# Patient Record
Sex: MALE | Race: WHITE | NOT HISPANIC OR LATINO | Employment: FULL TIME | URBAN - METROPOLITAN AREA
[De-identification: names, ages, dates, MRNs, and addresses within clinical notes are randomized per-mention and may not be internally consistent; named-entity substitution may affect disease eponyms.]

---

## 2024-04-23 ENCOUNTER — OFFICE VISIT (OUTPATIENT)
Dept: FAMILY MEDICINE CLINIC | Facility: CLINIC | Age: 49
End: 2024-04-23
Payer: COMMERCIAL

## 2024-04-23 VITALS
HEART RATE: 84 BPM | WEIGHT: 231 LBS | BODY MASS INDEX: 34.21 KG/M2 | DIASTOLIC BLOOD PRESSURE: 78 MMHG | SYSTOLIC BLOOD PRESSURE: 126 MMHG | OXYGEN SATURATION: 97 % | HEIGHT: 69 IN | TEMPERATURE: 98.2 F

## 2024-04-23 DIAGNOSIS — I10 PRIMARY HYPERTENSION: ICD-10-CM

## 2024-04-23 DIAGNOSIS — E78.5 HYPERLIPIDEMIA, UNSPECIFIED HYPERLIPIDEMIA TYPE: ICD-10-CM

## 2024-04-23 DIAGNOSIS — M25.562 CHRONIC PAIN OF BOTH KNEES: ICD-10-CM

## 2024-04-23 DIAGNOSIS — R73.03 PREDIABETES: ICD-10-CM

## 2024-04-23 DIAGNOSIS — Z76.89 ESTABLISHING CARE WITH NEW DOCTOR, ENCOUNTER FOR: Primary | ICD-10-CM

## 2024-04-23 DIAGNOSIS — E29.1 HYPOGONADISM IN MALE: ICD-10-CM

## 2024-04-23 DIAGNOSIS — M25.561 CHRONIC PAIN OF BOTH KNEES: ICD-10-CM

## 2024-04-23 DIAGNOSIS — M45.9 ANKYLOSING SPONDYLITIS, UNSPECIFIED SITE OF SPINE: ICD-10-CM

## 2024-04-23 DIAGNOSIS — R53.83 OTHER FATIGUE: ICD-10-CM

## 2024-04-23 DIAGNOSIS — G89.29 CHRONIC PAIN OF BOTH KNEES: ICD-10-CM

## 2024-04-23 DIAGNOSIS — E66.9 CLASS 1 OBESITY WITH SERIOUS COMORBIDITY AND BODY MASS INDEX (BMI) OF 34.0 TO 34.9 IN ADULT, UNSPECIFIED OBESITY TYPE: ICD-10-CM

## 2024-04-23 PROBLEM — E66.811 CLASS 1 OBESITY WITH SERIOUS COMORBIDITY AND BODY MASS INDEX (BMI) OF 34.0 TO 34.9 IN ADULT: Status: ACTIVE | Noted: 2024-04-23

## 2024-04-23 PROCEDURE — 99204 OFFICE O/P NEW MOD 45 MIN: CPT | Performed by: NURSE PRACTITIONER

## 2024-04-23 RX ORDER — L.ACID,CASEI/B.ANIMAL/S.THERMO 16B CELL
1 CAPSULE ORAL DAILY
COMMUNITY

## 2024-04-23 RX ORDER — UBIDECARENONE 100 MG
100 CAPSULE ORAL DAILY
COMMUNITY

## 2024-04-23 RX ORDER — TESTOSTERONE CYPIONATE 200 MG/ML
VIAL (ML) INTRAMUSCULAR
COMMUNITY

## 2024-04-23 RX ORDER — PRAVASTATIN SODIUM 10 MG
1 TABLET ORAL DAILY
COMMUNITY
Start: 2024-02-21

## 2024-04-23 RX ORDER — AMITRIPTYLINE HYDROCHLORIDE 100 MG/1
100 TABLET ORAL
COMMUNITY
Start: 2024-01-22

## 2024-04-23 RX ORDER — TRAMADOL HYDROCHLORIDE 50 MG/1
50-100 TABLET ORAL EVERY 6 HOURS PRN
COMMUNITY
Start: 2024-03-30

## 2024-04-23 RX ORDER — OMEPRAZOLE 40 MG/1
40 CAPSULE, DELAYED RELEASE ORAL DAILY
COMMUNITY
Start: 2024-03-18

## 2024-04-23 RX ORDER — LISINOPRIL 40 MG/1
1 TABLET ORAL DAILY
COMMUNITY
Start: 2024-01-22

## 2024-04-23 RX ORDER — METFORMIN HYDROCHLORIDE 500 MG/1
2 TABLET, EXTENDED RELEASE ORAL DAILY
COMMUNITY
Start: 2024-02-26 | End: 2024-04-25 | Stop reason: DRUGHIGH

## 2024-04-23 RX ORDER — INDOMETHACIN 50 MG/1
1 CAPSULE ORAL 3 TIMES DAILY
COMMUNITY
Start: 2024-04-01

## 2024-04-23 RX ORDER — LORATADINE 10 MG/1
CAPSULE, LIQUID FILLED ORAL
COMMUNITY

## 2024-04-23 RX ORDER — ATENOLOL 25 MG/1
25 TABLET ORAL DAILY
COMMUNITY
Start: 2024-04-01

## 2024-04-23 RX ORDER — MULTIPLE VITAMINS W/ MINERALS TAB 9MG-400MCG
1 TAB ORAL DAILY
COMMUNITY

## 2024-04-23 NOTE — ASSESSMENT & PLAN NOTE
Patient's (Body mass index is 34.11 kg/m².) indicates that they are obese (BMI >30) with health conditions that include hypertension, diabetes mellitus, dyslipidemias, and GERD . Weight is newly identified. BMI  is above average; BMI management plan is completed. We discussed portion control and increasing exercise.

## 2024-04-23 NOTE — PROGRESS NOTES
"Chief Complaint  Establish Care and Knee Pain        Dheeraj Flores presents to [facility]NAL MEDIC[facility]        Subjective      48-year-old male patient presents today to establish care. Multiple concerns. Formerly of Dr. Candelaria.     Bilateral knee issues for a year or more. Continues to worsen with time. Right knee is more symptomatic than left. When he bends or squats feels like right knee is \"tearing apart\".  Results of grinding and sometimes popping. No trauma or injuries to knee before. Left knee with similar feeling but not as often and much milder pain. Has never had imaging of knees.    Depression -currently on a regimen of sertraline and amitriptyline. Was prescribed for depression several years ago. Moods are stable but at times low because he is fatigued. He is able to deal with feelings better being on medications and without. No SI, HI, self harm.     Hypogonadism -has been receiving injections since May 2022. Has helped with his fatigue.     Prediabetic - taking metformin 100 mg daily.  Unsure what his last A1c was.  Previous provider did not share results.    HTN - stable, doing well. Asymptomatic. No headache, visual disturbances. Has worsening vision - had check up at [facility] in [place]. Only needs readers and no script. Takes atenolol, lisinopril, statin.     Ankylosing spondylitis -reports he has been taking tramado for treatment. Will only take 1 in morning and two at night before bed.  Recently switched insurance and can only get a 7-day supply.  Will get imaging of spine today in office and evaluate further    GERD - taking omeprazole. Keeps symptoms at bay. On rare occasion will aggravate with food intake.  Has never seen gastroenterology.    Was with skin carcinoma on nose and completed radiation with [facility] in [place] April 5th. Will go back again May 6 for follow up.                     Objective         Vital Signs:     /78 (BP Location: Right arm, " "Patient Position: Sitting, Cuff Size: Adult)   Pulse 84   Temp 98.2 °F (36.8 °C) (Infrared)   Ht 175.3 cm (69\")   Wt 105 kg (231 lb)   SpO2 97%   BMI 34.11 kg/m²       Physical Exam  Vitals reviewed.   Constitutional:       Appearance: He is well-developed.      Comments:      HENT:      Head: Normocephalic and atraumatic.      Nose: Nose normal.      Mouth/Throat:      Mouth: Mucous membranes are moist.      Pharynx: Oropharynx is clear.   Eyes:      Conjunctiva/sclera: Conjunctivae normal.      Pupils: Pupils are equal, round, and reactive to light.   Cardiovascular:      Rate and Rhythm: Normal rate and regular rhythm.      Pulses: Normal pulses.      Heart sounds: Normal heart sounds.   Pulmonary:      Effort: Pulmonary effort is normal. No respiratory distress.      Breath sounds: Normal breath sounds. No stridor. No wheezing, rhonchi or rales.   Chest:      Chest wall: No tenderness.   Abdominal:      General: Bowel sounds are normal. There is no distension.      Palpations: Abdomen is soft. There is no mass.      Tenderness: There is no abdominal tenderness. There is no guarding or rebound.      Hernia: No hernia is present.   Musculoskeletal:         General: Normal range of motion.      Cervical back: Normal range of motion.      Right knee: Crepitus present. Decreased range of motion. Tenderness present over the MCL. No LCL, ACL, PCL or patellar tendon tenderness.      Left knee: Crepitus present. Decreased range of motion. Tenderness present over the MCL. No LCL, ACL, PCL or patellar tendon tenderness.   Skin:     General: Skin is warm and dry.      Findings: No rash.   Neurological:      Mental Status: He is alert and oriented to person, place, and time.   Psychiatric:         Behavior: Behavior normal.                History of Present Illness      Patient Active Problem List   Diagnosis    Establishing care with new doctor, encounter for    Chronic pain of both knees    Other fatigue    " Prediabetes    BMI 34.0-34.9,adult    Hypogonadism in male    Hyperlipidemia    Primary hypertension    Ankylosing spondylitis    Class 1 obesity with serious comorbidity and body mass index (BMI) of 34.0 to 34.9 in adult         Past Medical History:   Diagnosis Date    Arthritis     Hyperlipidemia     Hypertension     Sleep apnea           Family History   Family history unknown: Yes          Past Surgical History:   Procedure Laterality Date    APPENDECTOMY            Social History     Socioeconomic History    Marital status:    Tobacco Use    Smoking status: Former     Current packs/day: 0.00     Average packs/day: 2.0 packs/day for 28.0 years (56.0 ttl pk-yrs)     Types: Cigarettes     Start date:      Quit date: 2017     Years since quittin.3     Passive exposure: Past    Smokeless tobacco: Never   Vaping Use    Vaping status: Never Used   Substance and Sexual Activity    Alcohol use: Not Currently    Drug use: Never                    Result Review :                                  BMI cannot be calculated due to outdated height or weight values.  Please input a current height/weight in Vitals and re-renter BMIFOLLOWUP in Note to pull in correct documentation based on BMI range.               Assessment and Plan      Diagnoses and all orders for this visit:    1. Establishing care with new doctor, encounter for (Primary)    2. Chronic pain of both knees  -     XR Knee 1 or 2 View Bilateral (In Office)    3. Other fatigue  -     Testosterone, Free, Total  -     TSH  -     Hemoglobin A1c    4. Prediabetes  -     CBC & Differential  -     Comprehensive metabolic panel  -     Hemoglobin A1c  -     Lipid Panel    5. BMI 34.0-34.9,adult  -     CBC & Differential  -     Comprehensive metabolic panel    6. Class 1 obesity with serious comorbidity and body mass index (BMI) of 34.0 to 34.9 in adult, unspecified obesity type  Assessment & Plan:  Patient's (Body mass index is 34.11 kg/m².) indicates that  they are obese (BMI >30) with health conditions that include hypertension, diabetes mellitus, dyslipidemias, and GERD . Weight is newly identified. BMI  is above average; BMI management plan is completed. We discussed portion control and increasing exercise.       7. Hypogonadism in male  -     Testosterone, Free, Total  -     Ambulatory Referral to Endocrinology    8. Hyperlipidemia, unspecified hyperlipidemia type  -     Lipid Panel    9. Primary hypertension  -     CBC & Differential  -     Comprehensive metabolic panel    10. Ankylosing spondylitis, unspecified site of spine  -     XR Spine Lumbar 2 or 3 View (In Office)  -     Ambulatory Referral to Rheumatology      Patient newly establishing today with multiple chronic comorbidities.  Blood pressure stable doing well.  Asymptomatic.  Will obtain labs to assess prediabetes and other causes of fatigue including thyroid level.  Patient is on testosterone therapy will refer to endocrinology.  Will obtain imaging of spine and bilateral knees to evaluate further.  Patient likely with bilateral knee arthritis.  Will assess imaging and refer out if needed.    Inspect has been reviewed.  Patient is in need of tramadol. He has been informed to make an appointment. Has been set up contract with pain management.  I do think rheumatology is the best option for evaluation of his symptoms.                  Follow Up       No follow-ups on file.      Patient was given instructions and counseling regarding his condition or for health maintenance advice. Please see specific information pulled into the AVS if appropriate.     Karlene Betancourt, [id number]/23/202415:03 EDT  This note has been electronically signed

## 2024-04-24 ENCOUNTER — PATIENT ROUNDING (BHMG ONLY) (OUTPATIENT)
Dept: FAMILY MEDICINE CLINIC | Facility: CLINIC | Age: 49
End: 2024-04-24
Payer: COMMERCIAL

## 2024-04-24 LAB
ALBUMIN SERPL-MCNC: 3.7 G/DL (ref 4.1–5.1)
ALBUMIN/GLOB SERPL: 1.3 {RATIO} (ref 1.2–2.2)
ALP SERPL-CCNC: 104 IU/L (ref 44–121)
ALT SERPL-CCNC: 36 IU/L (ref 0–44)
AST SERPL-CCNC: 32 IU/L (ref 0–40)
BASOPHILS # BLD AUTO: 0.1 X10E3/UL (ref 0–0.2)
BASOPHILS NFR BLD AUTO: 1 %
BILIRUB SERPL-MCNC: 0.2 MG/DL (ref 0–1.2)
BUN SERPL-MCNC: 14 MG/DL (ref 6–24)
BUN/CREAT SERPL: 15 (ref 9–20)
CALCIUM SERPL-MCNC: 8.9 MG/DL (ref 8.7–10.2)
CHLORIDE SERPL-SCNC: 102 MMOL/L (ref 96–106)
CHOLEST SERPL-MCNC: 162 MG/DL (ref 100–199)
CO2 SERPL-SCNC: 25 MMOL/L (ref 20–29)
CREAT SERPL-MCNC: 0.91 MG/DL (ref 0.76–1.27)
EGFRCR SERPLBLD CKD-EPI 2021: 103 ML/MIN/1.73
EOSINOPHIL # BLD AUTO: 0.3 X10E3/UL (ref 0–0.4)
EOSINOPHIL NFR BLD AUTO: 5 %
ERYTHROCYTE [DISTWIDTH] IN BLOOD BY AUTOMATED COUNT: 17.3 % (ref 11.6–15.4)
GLOBULIN SER CALC-MCNC: 2.8 G/DL (ref 1.5–4.5)
GLUCOSE SERPL-MCNC: 123 MG/DL (ref 70–99)
HBA1C MFR BLD: 6.8 % (ref 4.8–5.6)
HCT VFR BLD AUTO: 39.8 % (ref 37.5–51)
HDLC SERPL-MCNC: 48 MG/DL
HGB BLD-MCNC: 12.9 G/DL (ref 13–17.7)
IMM GRANULOCYTES # BLD AUTO: 0 X10E3/UL (ref 0–0.1)
IMM GRANULOCYTES NFR BLD AUTO: 0 %
LDLC SERPL CALC-MCNC: 82 MG/DL (ref 0–99)
LYMPHOCYTES # BLD AUTO: 2.2 X10E3/UL (ref 0.7–3.1)
LYMPHOCYTES NFR BLD AUTO: 33 %
MCH RBC QN AUTO: 25.5 PG (ref 26.6–33)
MCHC RBC AUTO-ENTMCNC: 32.4 G/DL (ref 31.5–35.7)
MCV RBC AUTO: 79 FL (ref 79–97)
MONOCYTES # BLD AUTO: 0.4 X10E3/UL (ref 0.1–0.9)
MONOCYTES NFR BLD AUTO: 6 %
NEUTROPHILS # BLD AUTO: 3.6 X10E3/UL (ref 1.4–7)
NEUTROPHILS NFR BLD AUTO: 55 %
PLATELET # BLD AUTO: 297 X10E3/UL (ref 150–450)
POTASSIUM SERPL-SCNC: 4.4 MMOL/L (ref 3.5–5.2)
PROT SERPL-MCNC: 6.5 G/DL (ref 6–8.5)
RBC # BLD AUTO: 5.06 X10E6/UL (ref 4.14–5.8)
SODIUM SERPL-SCNC: 140 MMOL/L (ref 134–144)
TRIGL SERPL-MCNC: 192 MG/DL (ref 0–149)
TSH SERPL DL<=0.005 MIU/L-ACNC: 1.33 UIU/ML (ref 0.45–4.5)
VLDLC SERPL CALC-MCNC: 32 MG/DL (ref 5–40)
WBC # BLD AUTO: 6.5 X10E3/UL (ref 3.4–10.8)

## 2024-04-24 NOTE — PROGRESS NOTES
A [employer] message has been sent to the patient for PATIENT ROUNDING with [facility].

## 2024-04-25 DIAGNOSIS — E78.1 HYPERTRIGLYCERIDEMIA: Primary | ICD-10-CM

## 2024-04-25 DIAGNOSIS — M17.0 TRICOMPARTMENT OSTEOARTHRITIS OF BOTH KNEES: Primary | ICD-10-CM

## 2024-04-25 RX ORDER — OMEGA-3/DHA/EPA/FISH OIL 300-1000MG
2 CAPSULE ORAL DAILY
Qty: 60 CAPSULE | Refills: 11 | Status: SHIPPED | OUTPATIENT
Start: 2024-04-25 | End: 2025-04-25

## 2024-04-25 NOTE — PROGRESS NOTES
Lipid panel looks good with only a mild elevation in and triglycerides.  I would send to the pharmacy.  Adding a supplement will provide good cardiac protection especially since he is diabetic.    Regarding A1c at 6.8, I would recommend adjusting the metformin dosage.  I would like for him to take 2000 mg daily.  He can use the tablets he has at home which would be 4 of the 500 mg tablets.  He can take as a dose once daily or split the doses up to twice daily.  I will go ahead and send in a new prescription for 1000 mg twice daily

## 2024-04-25 NOTE — PROGRESS NOTES
Patient is with moderate tricompartmental degenerative arthritis right worse than left.  Will refer to orthopedics for further evaluation - Denis Herrera Wolverine

## 2024-04-25 NOTE — PROGRESS NOTES
Lumbar imaging shows diminished disc space in the lumbar sacral region.  No evidence of ankylosing spondylitis in the lumbar spine.  Physical therapy may help the discomfort but also evaluation by pain specialist may be able to provide some relief with alternative modes of treatment while avoiding opioid or narcotic therapy.  I would be happy to place referral

## 2024-04-28 LAB
TESTOST FREE SERPL-MCNC: 3.4 PG/ML (ref 7.2–24)
TESTOST SERPL-MCNC: 137 NG/DL (ref 264–916)

## 2024-04-29 DIAGNOSIS — R53.83 OTHER FATIGUE: ICD-10-CM

## 2024-04-29 DIAGNOSIS — R79.89 LOW TESTOSTERONE IN MALE: Primary | ICD-10-CM

## 2024-04-29 DIAGNOSIS — E29.1 HYPOGONADISM IN MALE: ICD-10-CM

## 2024-04-29 NOTE — PROGRESS NOTES
Remainder of labs have returned.  Testosterone levels are low.  I would like to get him referred to endocrinology for further evaluation and perhaps treatment of low levels

## 2024-05-06 ENCOUNTER — PATIENT ROUNDING (BHMG ONLY) (OUTPATIENT)
Dept: ENDOCRINOLOGY | Facility: CLINIC | Age: 49
End: 2024-05-06
Payer: COMMERCIAL

## 2024-05-06 ENCOUNTER — OFFICE VISIT (OUTPATIENT)
Dept: ENDOCRINOLOGY | Facility: CLINIC | Age: 49
End: 2024-05-06
Payer: COMMERCIAL

## 2024-05-06 ENCOUNTER — OFFICE VISIT (OUTPATIENT)
Dept: ORTHOPEDIC SURGERY | Facility: CLINIC | Age: 49
End: 2024-05-06
Payer: COMMERCIAL

## 2024-05-06 VITALS
WEIGHT: 231 LBS | HEIGHT: 69 IN | OXYGEN SATURATION: 95 % | RESPIRATION RATE: 20 BRPM | BODY MASS INDEX: 34.21 KG/M2 | HEART RATE: 75 BPM

## 2024-05-06 VITALS
DIASTOLIC BLOOD PRESSURE: 60 MMHG | BODY MASS INDEX: 33.92 KG/M2 | SYSTOLIC BLOOD PRESSURE: 110 MMHG | OXYGEN SATURATION: 98 % | WEIGHT: 229 LBS | HEIGHT: 69 IN | HEART RATE: 95 BPM

## 2024-05-06 DIAGNOSIS — G89.29 CHRONIC PAIN OF LEFT KNEE: ICD-10-CM

## 2024-05-06 DIAGNOSIS — R79.89 LOW TESTOSTERONE IN MALE: Primary | ICD-10-CM

## 2024-05-06 DIAGNOSIS — G47.33 OSA (OBSTRUCTIVE SLEEP APNEA): ICD-10-CM

## 2024-05-06 DIAGNOSIS — M17.11 PRIMARY OSTEOARTHRITIS OF RIGHT KNEE: Primary | ICD-10-CM

## 2024-05-06 DIAGNOSIS — M25.562 CHRONIC PAIN OF LEFT KNEE: ICD-10-CM

## 2024-05-06 DIAGNOSIS — E11.65 TYPE 2 DIABETES MELLITUS WITH HYPERGLYCEMIA, WITHOUT LONG-TERM CURRENT USE OF INSULIN: ICD-10-CM

## 2024-05-06 DIAGNOSIS — E66.9 CLASS 1 OBESITY WITH SERIOUS COMORBIDITY AND BODY MASS INDEX (BMI) OF 33.0 TO 33.9 IN ADULT, UNSPECIFIED OBESITY TYPE: ICD-10-CM

## 2024-05-06 PROCEDURE — 99203 OFFICE O/P NEW LOW 30 MIN: CPT | Performed by: PHYSICIAN ASSISTANT

## 2024-05-06 PROCEDURE — 99204 OFFICE O/P NEW MOD 45 MIN: CPT | Performed by: INTERNAL MEDICINE

## 2024-05-07 ENCOUNTER — PATIENT ROUNDING (BHMG ONLY) (OUTPATIENT)
Dept: ORTHOPEDIC SURGERY | Facility: CLINIC | Age: 49
End: 2024-05-07
Payer: COMMERCIAL

## 2024-05-08 ENCOUNTER — LAB (OUTPATIENT)
Dept: LAB | Facility: HOSPITAL | Age: 49
End: 2024-05-08
Payer: COMMERCIAL

## 2024-05-08 DIAGNOSIS — R79.89 LOW TESTOSTERONE IN MALE: ICD-10-CM

## 2024-05-08 LAB
ALBUMIN SERPL-MCNC: 3.7 G/DL (ref 3.5–5.2)
ALBUMIN/GLOB SERPL: 1.2 G/DL
ALP SERPL-CCNC: 92 U/L (ref 39–117)
ALT SERPL W P-5'-P-CCNC: 26 U/L (ref 1–41)
ANION GAP SERPL CALCULATED.3IONS-SCNC: 10.7 MMOL/L (ref 5–15)
AST SERPL-CCNC: 22 U/L (ref 1–40)
BASOPHILS # BLD AUTO: 0.07 10*3/MM3 (ref 0–0.2)
BASOPHILS NFR BLD AUTO: 1 % (ref 0–1.5)
BILIRUB SERPL-MCNC: 0.2 MG/DL (ref 0–1.2)
BUN SERPL-MCNC: 16 MG/DL (ref 6–20)
BUN/CREAT SERPL: 15.2 (ref 7–25)
CALCIUM SPEC-SCNC: 9.1 MG/DL (ref 8.6–10.5)
CHLORIDE SERPL-SCNC: 102 MMOL/L (ref 98–107)
CO2 SERPL-SCNC: 25.3 MMOL/L (ref 22–29)
CREAT SERPL-MCNC: 1.05 MG/DL (ref 0.76–1.27)
DEPRECATED RDW RBC AUTO: 51.3 FL (ref 37–54)
EGFRCR SERPLBLD CKD-EPI 2021: 86.5 ML/MIN/1.73
EOSINOPHIL # BLD AUTO: 0.24 10*3/MM3 (ref 0–0.4)
EOSINOPHIL NFR BLD AUTO: 3.4 % (ref 0.3–6.2)
ERYTHROCYTE [DISTWIDTH] IN BLOOD BY AUTOMATED COUNT: 17.5 % (ref 12.3–15.4)
FSH SERPL-ACNC: 6.02 MIU/ML
GLOBULIN UR ELPH-MCNC: 3.1 GM/DL
GLUCOSE SERPL-MCNC: 118 MG/DL (ref 65–99)
HCT VFR BLD AUTO: 43.4 % (ref 37.5–51)
HGB BLD-MCNC: 13.5 G/DL (ref 13–17.7)
IMM GRANULOCYTES # BLD AUTO: 0.01 10*3/MM3 (ref 0–0.05)
IMM GRANULOCYTES NFR BLD AUTO: 0.1 % (ref 0–0.5)
LH SERPL-ACNC: 6.08 MIU/ML
LYMPHOCYTES # BLD AUTO: 2.11 10*3/MM3 (ref 0.7–3.1)
LYMPHOCYTES NFR BLD AUTO: 29.8 % (ref 19.6–45.3)
MCH RBC QN AUTO: 25.2 PG (ref 26.6–33)
MCHC RBC AUTO-ENTMCNC: 31.1 G/DL (ref 31.5–35.7)
MCV RBC AUTO: 81 FL (ref 79–97)
MONOCYTES # BLD AUTO: 0.49 10*3/MM3 (ref 0.1–0.9)
MONOCYTES NFR BLD AUTO: 6.9 % (ref 5–12)
NEUTROPHILS NFR BLD AUTO: 4.15 10*3/MM3 (ref 1.7–7)
NEUTROPHILS NFR BLD AUTO: 58.8 % (ref 42.7–76)
NRBC BLD AUTO-RTO: 0 /100 WBC (ref 0–0.2)
PLATELET # BLD AUTO: 317 10*3/MM3 (ref 140–450)
PMV BLD AUTO: 9.4 FL (ref 6–12)
POTASSIUM SERPL-SCNC: 4.5 MMOL/L (ref 3.5–5.2)
PROLACTIN SERPL-MCNC: 6.84 NG/ML (ref 4.04–15.2)
PROT SERPL-MCNC: 6.8 G/DL (ref 6–8.5)
RBC # BLD AUTO: 5.36 10*6/MM3 (ref 4.14–5.8)
SODIUM SERPL-SCNC: 138 MMOL/L (ref 136–145)
WBC NRBC COR # BLD AUTO: 7.07 10*3/MM3 (ref 3.4–10.8)

## 2024-05-08 PROCEDURE — 84402 ASSAY OF FREE TESTOSTERONE: CPT

## 2024-05-08 PROCEDURE — 84153 ASSAY OF PSA TOTAL: CPT

## 2024-05-08 PROCEDURE — 84403 ASSAY OF TOTAL TESTOSTERONE: CPT

## 2024-05-08 PROCEDURE — 80053 COMPREHEN METABOLIC PANEL: CPT

## 2024-05-08 PROCEDURE — 83002 ASSAY OF GONADOTROPIN (LH): CPT

## 2024-05-08 PROCEDURE — 84146 ASSAY OF PROLACTIN: CPT

## 2024-05-08 PROCEDURE — 83001 ASSAY OF GONADOTROPIN (FSH): CPT

## 2024-05-08 PROCEDURE — 85025 COMPLETE CBC W/AUTO DIFF WBC: CPT

## 2024-05-08 PROCEDURE — 36415 COLL VENOUS BLD VENIPUNCTURE: CPT

## 2024-05-09 LAB
PSA SERPL-MCNC: 0.4 NG/ML (ref 0–4)
REFLEX: NORMAL

## 2024-05-15 LAB
TESTOST FREE SERPL-MCNC: 9.3 PG/ML (ref 7.2–24)
TESTOST SERPL-MCNC: 205 NG/DL (ref 264–916)

## 2024-05-21 ENCOUNTER — OFFICE VISIT (OUTPATIENT)
Dept: ORTHOPEDIC SURGERY | Facility: CLINIC | Age: 49
End: 2024-05-21
Payer: COMMERCIAL

## 2024-05-21 VITALS — HEART RATE: 98 BPM | OXYGEN SATURATION: 96 % | HEIGHT: 69 IN | BODY MASS INDEX: 34.8 KG/M2 | WEIGHT: 235 LBS

## 2024-05-21 DIAGNOSIS — M17.11 PRIMARY OSTEOARTHRITIS OF RIGHT KNEE: Primary | ICD-10-CM

## 2024-05-21 DIAGNOSIS — M25.562 CHRONIC PAIN OF LEFT KNEE: ICD-10-CM

## 2024-05-21 DIAGNOSIS — G89.29 CHRONIC PAIN OF LEFT KNEE: ICD-10-CM

## 2024-05-21 RX ORDER — LIDOCAINE HYDROCHLORIDE 10 MG/ML
2 INJECTION, SOLUTION EPIDURAL; INFILTRATION; INTRACAUDAL; PERINEURAL
Status: COMPLETED | OUTPATIENT
Start: 2024-05-21 | End: 2024-05-21

## 2024-05-21 RX ORDER — TRIAMCINOLONE ACETONIDE 40 MG/ML
80 INJECTION, SUSPENSION INTRA-ARTICULAR; INTRAMUSCULAR
Status: COMPLETED | OUTPATIENT
Start: 2024-05-21 | End: 2024-05-21

## 2024-05-21 RX ADMIN — TRIAMCINOLONE ACETONIDE 80 MG: 40 INJECTION, SUSPENSION INTRA-ARTICULAR; INTRAMUSCULAR at 15:35

## 2024-05-21 RX ADMIN — LIDOCAINE HYDROCHLORIDE 2 ML: 10 INJECTION, SOLUTION EPIDURAL; INFILTRATION; INTRACAUDAL; PERINEURAL at 15:35

## 2024-05-21 NOTE — PROGRESS NOTES
"   Patient ID: Dheeraj Flores is a 48 y.o. male  presents for follow up on bilateral knee pain secondary to known osteoarthritis, right  greater than left. Reports having to rely on the left knee more to squat and kneel due to pain. States \"it feels like it is ripping apart when I bend (flex) my right knee back behind my thigh\". Qualifies sharp with provocation. Provocative: kneeling down on right knee \"Feels like something is ripping in there\", worse with hyper flexion. Treatment: Activity modifications, Indocin 50mg, Turmeric, tylenol, occasional walking stick for ambulation assistance, rest, weight loss,.  We discussed various treatment options to include steroid versus HA versus PRP.  Patient wishes to move forward with Zilretta steroid injection.  Diabetic last A1c 6.8 on 4/23/2024    Objective:  Pulse 98   Ht 175.3 cm (69\")   Wt 107 kg (235 lb)   SpO2 96%   BMI 34.70 kg/m²     Physical Examination:     Right knee:  Mild warmth no effusion  Tenderness along the medial joint line and anteromedial aspect  Extension 0, flexion 105+  Mild laxity  Varus and valgus stress  Range of motion of the hip and ankle grossly intact    Imaging:   No new imaging    Assessment:    Diagnoses and all orders for this visit:    1. Primary osteoarthritis of right knee (Primary)    2. 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  Patient informed to monitor his glucose levels over the next 2 to 3 days to prevent hyperglycemia and to avoid carbohydrates.  Follow-up in 6 to 8 weeks for further evaluation and and possible methylprednisolone injection.    Large Joint Arthrocentesis: R knee  Date/Time: 5/21/2024 3:35 PM  Consent given by: patient  Site marked: site marked  Timeout: Immediately prior to procedure a time out was called to verify the correct " patient, procedure, equipment, support staff and site/side marked as required   Supporting Documentation  Indications: pain   Procedure Details  Location: knee - R knee  Preparation: Patient was prepped and draped in the usual sterile fashion  Needle size: 22 G  Approach: anterolateral  Medications administered: 80 mg triamcinolone acetonide 40 MG/ML; 2 mL lidocaine PF 1% 1 %  Patient tolerance: patient tolerated the procedure well with no immediate complications      Disclaimer: Part of this note may be an electronic transcription/translation of spoken language to printed text using the Dragon Dictation System

## 2024-05-29 ENCOUNTER — TELEPHONE (OUTPATIENT)
Dept: ENDOCRINOLOGY | Facility: CLINIC | Age: 49
End: 2024-05-29

## 2024-05-29 NOTE — TELEPHONE ENCOUNTER
Provider: KEISHA    Caller: MELANIE CARSON    Relationship to Patient: SELF    Reason for Call: PATIENT WOULD LIKE TO HAVE HIS MRI pituitary SCAN DONE AT [facility] IN [place], INSTEAD OF [facility]. PLEASE ADVISE

## 2024-05-31 ENCOUNTER — LAB (OUTPATIENT)
Dept: LAB | Facility: HOSPITAL | Age: 49
End: 2024-05-31
Payer: COMMERCIAL

## 2024-05-31 DIAGNOSIS — R79.89 LOW TESTOSTERONE IN MALE: ICD-10-CM

## 2024-05-31 PROCEDURE — 84402 ASSAY OF FREE TESTOSTERONE: CPT

## 2024-05-31 PROCEDURE — 36415 COLL VENOUS BLD VENIPUNCTURE: CPT

## 2024-05-31 PROCEDURE — 84403 ASSAY OF TOTAL TESTOSTERONE: CPT

## 2024-06-07 ENCOUNTER — OFFICE VISIT (OUTPATIENT)
Dept: ENDOCRINOLOGY | Facility: CLINIC | Age: 49
End: 2024-06-07
Payer: COMMERCIAL

## 2024-06-07 VITALS
OXYGEN SATURATION: 98 % | HEART RATE: 89 BPM | WEIGHT: 232 LBS | SYSTOLIC BLOOD PRESSURE: 112 MMHG | HEIGHT: 69 IN | DIASTOLIC BLOOD PRESSURE: 74 MMHG | BODY MASS INDEX: 34.36 KG/M2

## 2024-06-07 DIAGNOSIS — R79.89 LOW TESTOSTERONE IN MALE: Primary | ICD-10-CM

## 2024-06-07 DIAGNOSIS — G47.33 OSA (OBSTRUCTIVE SLEEP APNEA): ICD-10-CM

## 2024-06-07 DIAGNOSIS — E66.9 CLASS 1 OBESITY WITH SERIOUS COMORBIDITY AND BODY MASS INDEX (BMI) OF 33.0 TO 33.9 IN ADULT, UNSPECIFIED OBESITY TYPE: ICD-10-CM

## 2024-06-07 DIAGNOSIS — E11.65 TYPE 2 DIABETES MELLITUS WITH HYPERGLYCEMIA, WITHOUT LONG-TERM CURRENT USE OF INSULIN: ICD-10-CM

## 2024-06-07 LAB
TESTOST FREE SERPL-MCNC: 5.8 PG/ML (ref 7.2–24)
TESTOST SERPL-MCNC: 164 NG/DL (ref 264–916)

## 2024-06-07 NOTE — PROGRESS NOTES
-----------------------------------------------------------------  ENDOCRINE CLINIC NOTE  -----------------------------------------------------------------        PATIENT NAME: Dheeraj Flores  PATIENT : 1975 AGE: 48 y.o.  MRN NUMBER: [id number]  PRIMARY CARE: Karlene Betancourt APRN    ==========================================================================    CHIEF COMPLAINT: Low testosterone level  DATE OF SERVICE: 24    ==========================================================================    HPI / SUBJECTIVE    48 y.o. male is seen in the clinic today for low testosterone level.  Patient since the last visit had 2 set of blood work done, pending serum testosterone levels from 2024.  There is evidence of persistent low testosterone level with normal FSH and LH therefore MRI pituitary was ordered, currently in process of scheduling MRI pituitary to be completed at [facility].  Patient had previously received testosterone therapy from 2023 up till 2024.  Reports improvement in symptoms with testosterone replacement.  Patient has been experiencing fatigue for last 3 years along with decreased libido and erectile dysfunction which she explains as inability to attain and maintain erection.  Patient had 4 biological kids with no reported trauma to the groin.  Patient to have exposure to radiation due to nose lesion otherwise underlying history of obstructive sleep apnea on CPAP therapy.  Patient is on chronic steroid therapy/tramadol for 10 to 12 years time with BMI of 34.26.  No history of prostate issues and no history of blood clotting disorder.    ==========================================================================                                                PAST MEDICAL HISTORY    Past Medical History:   Diagnosis Date    Arthritis     Arthritis of back     Cervical disc disorder     Hip arthrosis     Hyperlipidemia     Hypertension     Knee  swelling     Lumbosacral disc disease     Sleep apnea     Type 2 diabetes mellitus        ==========================================================================    PAST SURGICAL HISTORY    Past Surgical History:   Procedure Laterality Date    APPENDECTOMY         ==========================================================================    FAMILY HISTORY    Family History   Family history unknown: Yes       ==========================================================================    SOCIAL HISTORY    Social History     Socioeconomic History    Marital status:    Tobacco Use    Smoking status: Former     Current packs/day: 0.00     Average packs/day: 2.0 packs/day for 28.0 years (56.0 ttl pk-yrs)     Types: Cigarettes     Start date: 1989     Quit date: 2017     Years since quittin.4     Passive exposure: Past    Smokeless tobacco: Never   Vaping Use    Vaping status: Never Used   Substance and Sexual Activity    Alcohol use: Not Currently    Drug use: Never    Sexual activity: Defer       ==========================================================================    MEDICATIONS      Current Outpatient Medications:     amitriptyline (ELAVIL) 100 MG tablet, Take 1 tablet by mouth every night at bedtime., Disp: , Rfl:     atenolol (TENORMIN) 25 MG tablet, Take 1 tablet by mouth Daily., Disp: , Rfl:     coenzyme Q10 100 MG capsule, Take 1 capsule by mouth Daily., Disp: , Rfl:     fish oil-omega-3 fatty acids 1000 MG capsule, Take 2 capsules by mouth Daily., Disp: 60 capsule, Rfl: 11    indomethacin (INDOCIN) 50 MG capsule, Take 1 capsule by mouth 3 times a day., Disp: , Rfl:     lisinopril (PRINIVIL,ZESTRIL) 40 MG tablet, Take 1 tablet by mouth Daily., Disp: , Rfl:     Loratadine 10 MG capsule, Take  by mouth., Disp: , Rfl:     metFORMIN (GLUCOPHAGE) 1000 MG tablet, Take 1 tablet by mouth 2 (Two) Times a Day With Meals., Disp: 60 tablet, Rfl: 2    multivitamin with minerals tablet tablet, Take 1  tablet by mouth Daily., Disp: , Rfl:     omeprazole (priLOSEC) 40 MG capsule, Take 1 capsule by mouth Daily., Disp: , Rfl:     pravastatin (PRAVACHOL) 10 MG tablet, Take 1 tablet by mouth Daily., Disp: , Rfl:     Probiotic Product (Risaquad-2) capsule capsule, Take 1 capsule by mouth Daily., Disp: , Rfl:     sertraline (ZOLOFT) 50 MG tablet, Take 1 tablet by mouth Daily., Disp: , Rfl:     Testosterone Cypionate 200 MG/ML solution, Inject  as directed., Disp: , Rfl:     traMADol (ULTRAM) 50 MG tablet, Take 1-2 tablets by mouth Every 6 (Six) Hours As Needed., Disp: , Rfl:     Turmeric (QC TUMERIC COMPLEX PO), Take  by mouth., Disp: , Rfl:     ==========================================================================    ALLERGIES    No Known Allergies    ==========================================================================    OBJECTIVE    Vitals:    06/07/24 1450   BP: 112/74   Pulse: 89   SpO2: 98%     Body mass index is 34.26 kg/m².     General: Alert, cooperative, no acute distress    ==========================================================================    LAB EVALUATION    Lab Results   Component Value Date    GLUCOSE 118 (H) 05/08/2024    BUN 16 05/08/2024    CREATININE 1.05 05/08/2024    BCR 15.2 05/08/2024    K 4.5 05/08/2024    CO2 25.3 05/08/2024    CALCIUM 9.1 05/08/2024    PROTENTOTREF 6.5 04/23/2024    ALBUMIN 3.7 05/08/2024    LABIL2 1.3 04/23/2024    AST 22 05/08/2024    ALT 26 05/08/2024    TRIG 192 (H) 04/23/2024    HDL 48 04/23/2024    LDL 82 04/23/2024     Lab Results   Component Value Date    HGBA1C 6.8 (H) 04/23/2024     Lab Results   Component Value Date    CREATININE 1.05 05/08/2024     Lab Results   Component Value Date    TSH 1.330 04/23/2024       ==========================================================================    ASSESSMENT AND PLAN    # Low testosterone  -Still pending repeat testosterone level from 5/21/2024  - FSH and LH were inappropriately normal  - Hematocrit was  "normal  - PSA was normal as well  - MRI pituitary ordered, currently in the process of scheduling  - After the review of MRI pituitary will plan for further evaluation and management and will also review repeat testosterone level and if indicated we will start patient on testosterone replacement therapy  - Benefit and side effect of testosterone replacement therapy were already discussed with patient in detail to which will by his understanding and agreed with care    # Obesity with BMI of 34.26  # Obstructive sleep apnea, on CPAP therapy  # Type 2 diabetes, being managed by primary care    Thank you for courtesy of consultation.    Return to clinic: 2 months    Entire assessment and plan was discussed and counseled the patient in detail to which patient verbalized understanding and agreed with care.  Answered all queries and concerns.    This note was created using voice recognition software and is inherently subject to errors including those of syntax and \"sound-alike\" substitutions which may escape proofreading.  In such instances, original meaning may be extrapolated by contextual derivation.    Note: Portions of this note may have been copied from previous notes but documentation have been reviewed and edited as necessary to support clinical decision making for today's visit.    ==========================================================================    INFORMATION PROVIDED TO PATIENT    Patient Instructions   Please,    - Get MRI pituitary done.  - I am still waiting for your repeat testosterone level results.  - After reviewing both the blood work and MRI report which results we will plan for therapy or further evaluation as indicated.    Please schedule a follow-up appointment with me in 2 months time.    Thank you for your visit today.    If you have any questions or concerns please feel free to reach out of the office.       ==========================================================================  Parrish " MD Don  Department of Endocrine, Diabetes and Metabolism  [facility], [place]  ==========================================================================

## 2024-06-07 NOTE — PATIENT INSTRUCTIONS
Please,    - Get MRI pituitary done.  - I am still waiting for your repeat testosterone level results.  - After reviewing both the blood work and MRI report which results we will plan for therapy or further evaluation as indicated.    Please schedule a follow-up appointment with me in 2 months time.    Thank you for your visit today.    If you have any questions or concerns please feel free to reach out of the office.

## 2024-06-13 DIAGNOSIS — R79.89 LOW TESTOSTERONE IN MALE: ICD-10-CM

## 2024-06-17 RX ORDER — OMEPRAZOLE 40 MG/1
40 CAPSULE, DELAYED RELEASE ORAL DAILY
Qty: 90 CAPSULE | Refills: 1 | Status: SHIPPED | OUTPATIENT
Start: 2024-06-17

## 2024-06-26 ENCOUNTER — TELEPHONE (OUTPATIENT)
Dept: ENDOCRINOLOGY | Facility: CLINIC | Age: 49
End: 2024-06-26
Payer: COMMERCIAL

## 2024-06-26 RX ORDER — TESTOSTERONE CYPIONATE 1000 MG/10ML
100 INJECTION, SOLUTION INTRAMUSCULAR
Qty: 10 ML | Refills: 0 | Status: SHIPPED | OUTPATIENT
Start: 2024-06-26

## 2024-06-27 ENCOUNTER — TELEPHONE (OUTPATIENT)
Dept: ENDOCRINOLOGY | Facility: CLINIC | Age: 49
End: 2024-06-27
Payer: COMMERCIAL

## 2024-06-27 DIAGNOSIS — E23.6 EMPTY SELLA TURCICA: Primary | ICD-10-CM

## 2024-06-27 NOTE — TELEPHONE ENCOUNTER
Reviewed scanned reports and showed evidence of empty sella, date of scan is 6/824/2024. Please call patient and plan for repeat blood work before next visit, fasting and no later than 8:30 AM. Thanks.

## 2024-06-27 NOTE — TELEPHONE ENCOUNTER
Spoke with patient and relayed providers message, he voiced undestanding and scheduled for labs before follow up.

## 2024-06-27 NOTE — TELEPHONE ENCOUNTER
PA submitted for testosterone, Approved today  PA Case: [id number], Status: Approved, Coverage Starts on: 6/27/2024 12:00:00 AM, Coverage Ends on: 6/27/2025 12:00:00 AM.

## 2024-07-09 ENCOUNTER — OFFICE VISIT (OUTPATIENT)
Dept: ORTHOPEDIC SURGERY | Facility: CLINIC | Age: 49
End: 2024-07-09
Payer: COMMERCIAL

## 2024-07-09 VITALS — WEIGHT: 232 LBS | BODY MASS INDEX: 34.36 KG/M2 | HEART RATE: 100 BPM | HEIGHT: 69 IN

## 2024-07-09 DIAGNOSIS — M76.891 PES ANSERINUS TENDINITIS OF RIGHT LOWER EXTREMITY: Primary | ICD-10-CM

## 2024-07-09 PROCEDURE — 99212 OFFICE O/P EST SF 10 MIN: CPT | Performed by: PHYSICIAN ASSISTANT

## 2024-07-09 NOTE — PROGRESS NOTES
"   Patient ID: Dheeraj Flores is a 48 y.o. male returns with continued right knee pain over the superior medial aspects for the past 2+ months without known injury. Reports long periods of pushing on the decelerating pedal on a Bulldozer exacerbates the pain.  Also reports that kneeling down on the knee or squatting can cause increased tension along that medial aspect and create a pulling mild sharp pain.  He reports that the 5/21/24 injection of 80mg Kenalog provided a spongy feeling for a few days, but afterwards it returned to normal pain. Denies any issues with spiking glucose levels afterwards.     Objective:  Pulse 100   Ht 175.3 cm (69\")   Wt 105 kg (232 lb)   BMI 34.26 kg/m²     Physical Examination:     Right knee:  Tenderness to palpation over the  superior medial aspect of the knee  0 extension, flexion 125+  Negative medial and lateral Naeem  Negative joint line tenderness  Mild retropatellar crepitus  No laxity with varus and valgus stress  Negative calf tenderness  Range of motion to the hip and ankle grossly intact    Imaging:   XR Knee 1 or 2 View Bilateral (In Office) (04/23/2024 15:03)     Assessment:    Diagnoses and all orders for this visit:    1. Pes anserinus tendinitis of right lower extremity (Primary)    Plan: Recommend diclofenac gel applied to the superior-medial aspect of the knee 3-5 times daily. Also, recommend stretching and strengthening exercises to be performed 2-4 times weekly. If it fails to improve recommend MRI of the right knee for evaluation of tendon integrity.  Follow-up as needed    Disclaimer: Part of this note may be an electronic transcription/translation of spoken language to printed text using the Dragon Dictation System  "

## 2024-07-22 RX ORDER — LISINOPRIL 40 MG/1
40 TABLET ORAL DAILY
Qty: 90 TABLET | Refills: 0 | Status: SHIPPED | OUTPATIENT
Start: 2024-07-22

## 2024-08-07 ENCOUNTER — TELEPHONE (OUTPATIENT)
Dept: ENDOCRINOLOGY | Facility: CLINIC | Age: 49
End: 2024-08-07

## 2024-08-07 NOTE — TELEPHONE ENCOUNTER
[facility] staff attempted to follow warm transfer process and was unsuccessful     Caller: PENNIE CARSON    Relationship to patient: Emergency Contact    Best call back number: [phone]     Patient is needing: CALLING TO  APT DUE TO PROVIDER BEING OUT OF OFFICE. [facility] HAS NO AVAIL FOR SCHEDULING. PLEASE CALL BACK TO SCHEDULE.

## 2024-09-10 RX ORDER — ATENOLOL 25 MG/1
25 TABLET ORAL DAILY
Qty: 90 TABLET | Refills: 1 | Status: SHIPPED | OUTPATIENT
Start: 2024-09-10

## 2024-09-10 NOTE — TELEPHONE ENCOUNTER
Caller: PENNIE CARSON    Relationship: Emergency Contact    Best call back number: [phone]     Requested Prescriptions:   Requested Prescriptions     Pending Prescriptions Disp Refills    atenolol (TENORMIN) 25 MG tablet       Sig: Take 1 tablet by mouth Daily.        Pharmacy where request should be sent: [facility] [address] [place] [address] [address] [phone] [place] [phone]      Last office visit with prescribing clinician: 4/23/2024   Last telemedicine visit with prescribing clinician: Visit date not found   Next office visit with prescribing clinician: Visit date not found     Additional details provided by patient: PATIENT IS SCHEDULED TO SEE DR. ORTEGA ON 11/19/24.     Does the patient have less than a 3 day supply:  [] Yes  [x] No    Would you like a call back once the refill request has been completed: [] Yes [x] No    If the office needs to give you a call back, can they leave a voicemail: [] Yes [x] No    Ilya Alexis Rep   09/10/24 08:21 EDT

## 2024-10-14 RX ORDER — LISINOPRIL 40 MG/1
40 TABLET ORAL DAILY
Qty: 90 TABLET | Refills: 0 | Status: SHIPPED | OUTPATIENT
Start: 2024-10-14 | End: 2024-10-21 | Stop reason: SDUPTHER

## 2024-10-16 ENCOUNTER — LAB (OUTPATIENT)
Dept: LAB | Facility: HOSPITAL | Age: 49
End: 2024-10-16
Payer: COMMERCIAL

## 2024-10-16 DIAGNOSIS — R79.89 LOW TESTOSTERONE IN MALE: ICD-10-CM

## 2024-10-16 DIAGNOSIS — E23.6 EMPTY SELLA TURCICA: ICD-10-CM

## 2024-10-16 LAB
CORTIS SERPL-MCNC: 15.6 MCG/DL
FSH SERPL-ACNC: 1.87 MIU/ML
LH SERPL-ACNC: 1.62 MIU/ML
T4 FREE SERPL-MCNC: 0.99 NG/DL (ref 0.93–1.7)
TSH SERPL DL<=0.05 MIU/L-ACNC: 2.52 UIU/ML (ref 0.27–4.2)

## 2024-10-16 PROCEDURE — 84305 ASSAY OF SOMATOMEDIN: CPT

## 2024-10-16 PROCEDURE — 84402 ASSAY OF FREE TESTOSTERONE: CPT

## 2024-10-16 PROCEDURE — 82024 ASSAY OF ACTH: CPT

## 2024-10-16 PROCEDURE — 84443 ASSAY THYROID STIM HORMONE: CPT

## 2024-10-16 PROCEDURE — 83001 ASSAY OF GONADOTROPIN (FSH): CPT

## 2024-10-16 PROCEDURE — 82533 TOTAL CORTISOL: CPT

## 2024-10-16 PROCEDURE — 84403 ASSAY OF TOTAL TESTOSTERONE: CPT

## 2024-10-16 PROCEDURE — 83002 ASSAY OF GONADOTROPIN (LH): CPT

## 2024-10-16 PROCEDURE — 84439 ASSAY OF FREE THYROXINE: CPT

## 2024-10-16 PROCEDURE — 36415 COLL VENOUS BLD VENIPUNCTURE: CPT

## 2024-10-17 LAB — ACTH PLAS-MCNC: 50.3 PG/ML (ref 7.2–63.3)

## 2024-10-18 LAB — IGF-I SERPL-MCNC: 88 NG/ML (ref 81–263)

## 2024-10-19 LAB
TESTOST FREE SERPL-MCNC: 8.4 PG/ML (ref 7.2–24)
TESTOST SERPL-MCNC: 296 NG/DL (ref 264–916)

## 2024-10-21 RX ORDER — LISINOPRIL 40 MG/1
40 TABLET ORAL DAILY
Qty: 90 TABLET | Refills: 0 | Status: SHIPPED | OUTPATIENT
Start: 2024-10-21

## 2024-10-31 ENCOUNTER — OFFICE VISIT (OUTPATIENT)
Dept: ENDOCRINOLOGY | Facility: CLINIC | Age: 49
End: 2024-10-31
Payer: COMMERCIAL

## 2024-10-31 VITALS
DIASTOLIC BLOOD PRESSURE: 74 MMHG | HEIGHT: 69 IN | HEART RATE: 91 BPM | SYSTOLIC BLOOD PRESSURE: 122 MMHG | OXYGEN SATURATION: 97 % | BODY MASS INDEX: 34.07 KG/M2 | WEIGHT: 230 LBS

## 2024-10-31 DIAGNOSIS — R79.89 LOW TESTOSTERONE IN MALE: Primary | ICD-10-CM

## 2024-10-31 DIAGNOSIS — E66.9 OBESITY (BMI 30-39.9): ICD-10-CM

## 2024-10-31 DIAGNOSIS — E23.6 EMPTY SELLA TURCICA: ICD-10-CM

## 2024-10-31 DIAGNOSIS — G47.33 OSA (OBSTRUCTIVE SLEEP APNEA): ICD-10-CM

## 2024-10-31 DIAGNOSIS — E11.65 TYPE 2 DIABETES MELLITUS WITH HYPERGLYCEMIA, WITHOUT LONG-TERM CURRENT USE OF INSULIN: ICD-10-CM

## 2024-10-31 PROCEDURE — 99214 OFFICE O/P EST MOD 30 MIN: CPT | Performed by: INTERNAL MEDICINE

## 2024-10-31 RX ORDER — TESTOSTERONE CYPIONATE 1000 MG/10ML
100 INJECTION, SOLUTION INTRAMUSCULAR
Qty: 4 ML | Refills: 2 | Status: SHIPPED | OUTPATIENT
Start: 2024-10-31

## 2024-10-31 NOTE — PROGRESS NOTES
-----------------------------------------------------------------  ENDOCRINE CLINIC NOTE  -----------------------------------------------------------------        PATIENT NAME: Dheeraj Flores  PATIENT : 1975 AGE: 49 y.o.  MRN NUMBER: [id number]  PRIMARY CARE: Karlene Betancourt APRN    ==========================================================================    CHIEF COMPLAINT: Low testosterone level  DATE OF SERVICE: 10/31/24    ==========================================================================    HPI / SUBJECTIVE    49 y.o. male is seen in the clinic today for low testosterone level.  Patient currently on testosterone replacement therapy since 2024.  Currently maintained on testosterone 100 mg every 2 weeks.  For evaluation patient had normal FSH, LH and MRI was done which showed finding of possible empty sella syndrome.  Rest of the anterior pituitary workup was also ordered and within normal limits.  Patient have 4 biological kids with no reported trauma to the groin area.  Patient have history of exposure of radiation due to nose lesion otherwise denied any other radiation exposure.  History of obstructive sleep apnea, on CPAP therapy.  Patient is on chronic tramadol therapy.  BMI 33.97    ==========================================================================                                                PAST MEDICAL HISTORY    Past Medical History:   Diagnosis Date    Arthritis     Arthritis of back     Cervical disc disorder     Hip arthrosis     Hyperlipidemia     Hypertension     Knee swelling     Lumbosacral disc disease     Sleep apnea     Type 2 diabetes mellitus        ==========================================================================    PAST SURGICAL HISTORY    Past Surgical History:   Procedure Laterality Date    APPENDECTOMY         ==========================================================================    FAMILY HISTORY    Family History   Family history  unknown: Yes       ==========================================================================    SOCIAL HISTORY    Social History     Socioeconomic History    Marital status:    Tobacco Use    Smoking status: Former     Current packs/day: 0.00     Average packs/day: 2.0 packs/day for 28.0 years (56.0 ttl pk-yrs)     Types: Cigarettes     Start date: 1989     Quit date:      Years since quittin.8     Passive exposure: Past    Smokeless tobacco: Never   Vaping Use    Vaping status: Never Used   Substance and Sexual Activity    Alcohol use: Not Currently    Drug use: Never    Sexual activity: Yes     Partners: Female     Birth control/protection: Tubal ligation       ==========================================================================    MEDICATIONS      Current Outpatient Medications:     amitriptyline (ELAVIL) 100 MG tablet, Take 1 tablet by mouth every night at bedtime., Disp: , Rfl:     atenolol (TENORMIN) 25 MG tablet, Take 1 tablet by mouth Daily., Disp: 90 tablet, Rfl: 1    coenzyme Q10 100 MG capsule, Take 1 capsule by mouth Daily., Disp: , Rfl:     fish oil-omega-3 fatty acids 1000 MG capsule, Take 2 capsules by mouth Daily., Disp: 60 capsule, Rfl: 11    indomethacin (INDOCIN) 50 MG capsule, Take 1 capsule by mouth 3 times a day., Disp: , Rfl:     lisinopril (PRINIVIL,ZESTRIL) 40 MG tablet, Take 1 tablet by mouth Daily., Disp: 90 tablet, Rfl: 0    Loratadine 10 MG capsule, Take  by mouth., Disp: , Rfl:     metFORMIN (GLUCOPHAGE) 1000 MG tablet, TAKE 1 TABLET BY MOUTH TWICE DAILY WITH MEALS, Disp: 180 tablet, Rfl: 0    multivitamin with minerals tablet tablet, Take 1 tablet by mouth Daily., Disp: , Rfl:     omeprazole (priLOSEC) 40 MG capsule, Take 1 capsule by mouth Daily., Disp: 90 capsule, Rfl: 1    pravastatin (PRAVACHOL) 10 MG tablet, Take 1 tablet by mouth Daily., Disp: , Rfl:     Probiotic Product (Risaquad-2) capsule capsule, Take 1 capsule by mouth Daily., Disp: , Rfl:      "sertraline (ZOLOFT) 50 MG tablet, Take 1 tablet by mouth Daily., Disp: , Rfl:     Syringe/Needle, Disp, (BD Eclipse Syringe) 21G X 1\" 3 ML misc, Use 1 each Every 14 (Fourteen) Days. Every 14 days for testosterone injection, Disp: 6 each, Rfl: 3    traMADol (ULTRAM) 50 MG tablet, Take 1-2 tablets by mouth Every 6 (Six) Hours As Needed., Disp: , Rfl:     Turmeric (QC TUMERIC COMPLEX PO), Take  by mouth., Disp: , Rfl:     testosterone cypionate (DEPO-TESTOSTERONE) 100 MG/ML solution injection, Inject 1 mL into the appropriate muscle as directed by prescriber Every 14 (Fourteen) Days., Disp: 4 mL, Rfl: 2    ==========================================================================    ALLERGIES    No Known Allergies    ==========================================================================    OBJECTIVE    Vitals:    10/31/24 1410   BP: 122/74   Pulse: 91   SpO2: 97%     Body mass index is 33.97 kg/m².     General: Alert, cooperative, no acute distress    ==========================================================================    LAB EVALUATION    Lab Results   Component Value Date    GLUCOSE 118 (H) 05/08/2024    BUN 16 05/08/2024    CREATININE 1.05 05/08/2024    BCR 15.2 05/08/2024    K 4.5 05/08/2024    CO2 25.3 05/08/2024    CALCIUM 9.1 05/08/2024    PROTENTOTREF 6.5 04/23/2024    ALBUMIN 3.7 05/08/2024    LABIL2 1.3 04/23/2024    AST 22 05/08/2024    ALT 26 05/08/2024    TRIG 192 (H) 04/23/2024    HDL 48 04/23/2024    LDL 82 04/23/2024     Lab Results   Component Value Date    HGBA1C 6.8 (H) 04/23/2024     Lab Results   Component Value Date    CREATININE 1.05 05/08/2024     Lab Results   Component Value Date    TSH 2.520 10/16/2024    FREET4 0.99 10/16/2024       ==========================================================================    ASSESSMENT AND PLAN    # Low testosterone in male  - Patient is currently maintained on testosterone replacement therapy 100 mg every 2-week  - Repeat testosterone within " "acceptable limit  - Will continue same therapy and repeat blood work in 3 months time including serum testosterone level, CBC and CMP levels  - Will check for prostate levels in June 2025  - Inspect report reviewed    # Obesity with BMI of 33.97  # Obstructive sleep apnea, on CPAP therapy  # Type 2 diabetes, being managed by primary care    Thank you for courtesy of consultation.    Return to clinic: 2 months    Entire assessment and plan was discussed and counseled the patient in detail to which patient verbalized understanding and agreed with care.  Answered all queries and concerns.    This note was created using voice recognition software and is inherently subject to errors including those of syntax and \"sound-alike\" substitutions which may escape proofreading.  In such instances, original meaning may be extrapolated by contextual derivation.    Note: Portions of this note may have been copied from previous notes but documentation have been reviewed and edited as necessary to support clinical decision making for today's visit.    ==========================================================================    INFORMATION PROVIDED TO PATIENT    Patient Instructions   Please,    -Continue testosterone replacement therapy.  - Repeat blood work in 3 months time 7 days after the shot  - Blood work needs to be done in a fasting no later than 8:30 AM    Follow up in 3 months.    If you have any questions or concerns please feel free to reach out of the office.       ==========================================================================  Parrish Ochoa MD  Department of Endocrine, Diabetes and Metabolism  [facility], [place]  ==========================================================================  "

## 2024-10-31 NOTE — PATIENT INSTRUCTIONS
Please,    -Continue testosterone replacement therapy.  - Repeat blood work in 3 months time 7 days after the shot  - Blood work needs to be done in a fasting no later than 8:30 AM    Follow up in 3 months.    If you have any questions or concerns please feel free to reach out of the office.

## 2024-11-04 RX ORDER — AMITRIPTYLINE HYDROCHLORIDE 100 MG/1
100 TABLET ORAL
Qty: 90 TABLET | Refills: 0 | Status: SHIPPED | OUTPATIENT
Start: 2024-11-04

## 2024-11-04 NOTE — TELEPHONE ENCOUNTER
Caller: PENNIE CARSON    Relationship: Emergency Contact    Best call back number: [phone]     Requested Prescriptions:   Requested Prescriptions     Pending Prescriptions Disp Refills    amitriptyline (ELAVIL) 100 MG tablet       Sig: Take 1 tablet by mouth every night at bedtime.    metFORMIN (GLUCOPHAGE) 1000 MG tablet 180 tablet 0     Sig: Take 1 tablet by mouth 2 (Two) Times a Day With Meals.        Pharmacy where request should be sent: [place] [phone] [place], [place] [phone]     Last office visit with prescribing clinician: Visit date not found   Last telemedicine visit with prescribing clinician: Visit date not found   Next office visit with prescribing clinician: 11/4/2024         Does the patient have less than a 3 day supply:  [] Yes  [x] No    Would you like a call back once the refill request has been completed: [] Yes [x] No    If the office needs to give you a call back, can they leave a voicemail: [x] Yes [] No    Ilya Alva Rep   11/04/24 09:48 EST

## 2024-11-05 ENCOUNTER — TELEPHONE (OUTPATIENT)
Dept: FAMILY MEDICINE CLINIC | Facility: CLINIC | Age: 49
End: 2024-11-05
Payer: COMMERCIAL

## 2024-11-05 NOTE — TELEPHONE ENCOUNTER
METFORMIN WAS DENIED, NEEDS A TRIAL OF GENERIC GLUCOPHAGE XR PLEASE PUT IN NEW RX DENIAL LETTER IN CHART.

## 2024-11-05 NOTE — TELEPHONE ENCOUNTER
This is quite possibly the most ridiculous denial letter I've ever seen.  It tells me they will not cover generic extended release metformin because they have not seen evidence that he has been on generic extended release metformin.    Prescription already changed to regular release metformin.

## 2024-11-19 ENCOUNTER — OFFICE VISIT (OUTPATIENT)
Dept: FAMILY MEDICINE CLINIC | Facility: CLINIC | Age: 49
End: 2024-11-19
Payer: COMMERCIAL

## 2024-11-19 VITALS
OXYGEN SATURATION: 95 % | TEMPERATURE: 98 F | HEART RATE: 77 BPM | SYSTOLIC BLOOD PRESSURE: 117 MMHG | BODY MASS INDEX: 33.95 KG/M2 | WEIGHT: 229.23 LBS | RESPIRATION RATE: 18 BRPM | DIASTOLIC BLOOD PRESSURE: 76 MMHG | HEIGHT: 69 IN

## 2024-11-19 DIAGNOSIS — E78.2 MIXED HYPERLIPIDEMIA: ICD-10-CM

## 2024-11-19 DIAGNOSIS — M45.0 ANKYLOSING SPONDYLITIS OF MULTIPLE SITES IN SPINE: ICD-10-CM

## 2024-11-19 DIAGNOSIS — M25.561 CHRONIC PAIN OF BOTH KNEES: ICD-10-CM

## 2024-11-19 DIAGNOSIS — Z23 NEED FOR VACCINATION: ICD-10-CM

## 2024-11-19 DIAGNOSIS — R73.03 PREDIABETES: ICD-10-CM

## 2024-11-19 DIAGNOSIS — I10 PRIMARY HYPERTENSION: Primary | ICD-10-CM

## 2024-11-19 DIAGNOSIS — M25.562 CHRONIC PAIN OF BOTH KNEES: ICD-10-CM

## 2024-11-19 DIAGNOSIS — G89.29 CHRONIC PAIN OF BOTH KNEES: ICD-10-CM

## 2024-11-19 PROCEDURE — 90656 IIV3 VACC NO PRSV 0.5 ML IM: CPT | Performed by: FAMILY MEDICINE

## 2024-11-19 PROCEDURE — 90677 PCV20 VACCINE IM: CPT | Performed by: FAMILY MEDICINE

## 2024-11-19 PROCEDURE — 99214 OFFICE O/P EST MOD 30 MIN: CPT | Performed by: FAMILY MEDICINE

## 2024-11-19 PROCEDURE — 90471 IMMUNIZATION ADMIN: CPT | Performed by: FAMILY MEDICINE

## 2024-11-19 PROCEDURE — 90472 IMMUNIZATION ADMIN EACH ADD: CPT | Performed by: FAMILY MEDICINE

## 2024-11-19 RX ORDER — PRAVASTATIN SODIUM 10 MG
10 TABLET ORAL DAILY
Qty: 90 TABLET | Refills: 3 | Status: SHIPPED | OUTPATIENT
Start: 2024-11-19

## 2024-11-19 NOTE — PROGRESS NOTES
Subjective   Dheeraj Flores is a 49 y.o. male.   Chief Complaint   Patient presents with    [facility]    Pain    Prediabetes       History of Present Illness   49 y.o. male with past medical history as below presents to the office today.  New patient to me.  Previously saw Karlene Dowell.    It looks like she is treating him for diabetes, high blood pressure.    He sees Dr. Ochoa at the [facility] for hypogonadism.  I do not know why problems regarding his metformin have been sent to me.  Review of progress note from Dr. Ochoa on October 31 and the note indicates he was only treating him for hypogonadism.    His last A1c was April.  It was 6.8%.      History of Present Illness  The patient is a 49-year-old male who presents for evaluation of multiple medical concerns. He is accompanied by his wife.    He is here for a routine checkup. He has been under the care of Dr. Karlene Dowell for arthritis and has been taking tramadol for pain management. His previous doctor, Dr. Funk, diagnosed him with ankylosing spondylitis. He consulted a rheumatologist 20 years ago who prescribed methotrexate, but he discontinued it due to adverse effects. He then started taking indomethacin, which he tolerates well. He experiences constant hip pain. He takes tramadol twice daily, and occasionally three times if the pain is severe. He has been on this regimen for 15 years. He also takes amitriptyline and sertraline twice daily for stress and anger management, which he finds somewhat effective. He has undergone several MRIs in the past but not recently.    He is on metformin for diabetes management. His dosage has been gradually increased, and he has switched to Mounjaro. He was previously diagnosed as prediabetic. His A1c level was 6.6 in August 2023.    He is currently on testosterone therapy.    He is on Tenormin for blood pressure management. His blood pressure reading today is within the normal range.    He is scheduled  to receive an influenza vaccine. He has previously received the Tdap vaccine.    He is on pravastatin for cholesterol management.    He takes omeprazole for heartburn and reflux.    SOCIAL HISTORY  He quit smoking in 2017.      Patient Active Problem List    Diagnosis Date Noted    Tricompartment osteoarthritis of both knees 04/25/2024    Establishing care with new doctor, encounter for 04/23/2024    Chronic pain of both knees 04/23/2024    Other fatigue 04/23/2024    Prediabetes 04/23/2024    BMI 34.0-34.9,adult 04/23/2024    Hypogonadism in male 04/23/2024    Mixed hyperlipidemia 04/23/2024    Primary hypertension 04/23/2024    Ankylosing spondylitis of multiple sites in spine 04/23/2024     Note Last Updated: 11/19/2024     Rheumatologist in [place] early 2000's  +[id number]  Positive x-rays      Class 1 obesity with serious comorbidity and body mass index (BMI) of 34.0 to 34.9 in adult 04/23/2024           Past Surgical History:   Procedure Laterality Date    APPENDECTOMY       Current Outpatient Medications on File Prior to Visit   Medication Sig    amitriptyline (ELAVIL) 100 MG tablet Take 1 tablet by mouth every night at bedtime.    atenolol (TENORMIN) 25 MG tablet Take 1 tablet by mouth Daily.    coenzyme Q10 100 MG capsule Take 1 capsule by mouth Daily.    fish oil-omega-3 fatty acids 1000 MG capsule Take 2 capsules by mouth Daily.    indomethacin (INDOCIN) 50 MG capsule Take 1 capsule by mouth 2 (Two) Times a Day With Meals.    lisinopril (PRINIVIL,ZESTRIL) 40 MG tablet Take 1 tablet by mouth Daily.    Loratadine 10 MG capsule Take 1 capsule by mouth Daily.    metFORMIN (GLUCOPHAGE) 1000 MG tablet Take 1 tablet by mouth 2 (Two) Times a Day With Meals.    multivitamin with minerals tablet tablet Take 1 tablet by mouth Daily.    omeprazole (priLOSEC) 40 MG capsule Take 1 capsule by mouth Daily.    Probiotic Product (Risaquad-2) capsule capsule Take 1 capsule by mouth Daily.    sertraline (ZOLOFT) 50 MG tablet  "Take 1 tablet by mouth Daily.    Syringe/Needle, Disp, (BD Eclipse Syringe) 21G X 1\" 3 ML misc Use 1 each Every 14 (Fourteen) Days. Every 14 days for testosterone injection    testosterone cypionate (DEPO-TESTOSTERONE) 100 MG/ML solution injection Inject 1 mL into the appropriate muscle as directed by prescriber Every 14 (Fourteen) Days.    traMADol (ULTRAM) 50 MG tablet Take 1-2 tablets by mouth Every 6 (Six) Hours As Needed.    Turmeric 500 MG capsule Take 2 capsules by mouth 2 (Two) Times a Day.    [DISCONTINUED] pravastatin (PRAVACHOL) 10 MG tablet Take 1 tablet by mouth Daily.     No current facility-administered medications on file prior to visit.     No Known Allergies  Social History     Socioeconomic History    Marital status:    Tobacco Use    Smoking status: Former     Current packs/day: 0.00     Average packs/day: 2.0 packs/day for 28.0 years (56.0 ttl pk-yrs)     Types: Cigarettes     Start date: 1989     Quit date: 2017     Years since quittin.8     Passive exposure: Past    Smokeless tobacco: Never   Vaping Use    Vaping status: Never Used   Substance and Sexual Activity    Alcohol use: Not Currently    Drug use: Never    Sexual activity: Yes     Partners: Female     Birth control/protection: Tubal ligation     Family History   Family history unknown: Yes       Review of Systems    Objective   /76 (BP Location: Right arm, Patient Position: Sitting, Cuff Size: Large Adult)   Pulse 77   Temp 98 °F (36.7 °C) (Infrared)   Resp 18   Ht 175.3 cm (69\")   Wt 104 kg (229 lb 3.7 oz)   SpO2 95%   BMI 33.85 kg/m²   Physical Exam  Constitutional:       Appearance: He is well-developed.      Comments:      HENT:      Head: Normocephalic and atraumatic.   Eyes:      Conjunctiva/sclera: Conjunctivae normal.   Cardiovascular:      Rate and Rhythm: Normal rate.   Pulmonary:      Effort: Pulmonary effort is normal.   Musculoskeletal:         General: Normal range of motion.      Cervical " back: Normal range of motion.   Skin:     General: Skin is warm and dry.      Findings: No rash.   Neurological:      Mental Status: He is alert and oriented to person, place, and time.   Psychiatric:         Behavior: Behavior normal.       Physical Exam        Lab on 10/16/2024   Component Date Value Ref Range Status    Testosterone, Total 10/16/2024 296  264 - 916 ng/dL Final    Adult male reference interval is based on a population of  healthy nonobese males (BMI <30) between 19 and 39 years old.  Yasemin et.al. JCEM 2017,102;4408-7358. PMID: [id number].    Testosterone, Free 10/16/2024 8.4  7.2 - 24.0 pg/mL Final    TSH 10/16/2024 2.520  0.270 - 4.200 uIU/mL Final    Free T4 10/16/2024 0.99  0.93 - 1.70 ng/dL Final    Cortisol 10/16/2024 15.60    mcg/dL Final    ACTH 10/16/2024 50.3  7.2 - 63.3 pg/mL Final    ACTH reference interval for samples collected between 7 and 10 AM.    FSH 10/16/2024 1.87  mIU/mL Final    Insulin-Like Growth Factor-1 10/16/2024 88  81 - 263 ng/mL Final    LH 10/16/2024 1.62  mIU/mL Final     Results  Laboratory Studies  Sed rate of 10, CRP of 12. A1c was 6.8% seven months ago.    Imaging  X-ray of low back shows diminished disc space height at L5-S1.          Assessment & Plan   Diagnoses and all orders for this visit:    1. Primary hypertension (Primary)  -     Comprehensive Metabolic Panel  -     CBC & Differential    2. Mixed hyperlipidemia  -     pravastatin (PRAVACHOL) 10 MG tablet; Take 1 tablet by mouth Daily.  Dispense: 90 tablet; Refill: 3  -     Lipid Panel    3. Prediabetes  -     Hemoglobin A1c    4. Chronic pain of both knees    5. Ankylosing spondylitis of multiple sites in spine  -     HLA-B27 Antigen; Future      Assessment & Plan  Very complicated situation.  He has chronic back pain.  His previous doctor had given him tramadol.  Rheumatologist in the past told him he had ankylosing spondylitis.  X-rays done here within the last year were not entirely suggestive of  ankylosing spondylitis.  We are going to add an HLA-B27 to lab work we are doing today.  Will also consider an MRI of his spine.  Will continue tramadol for now.  He will let me know when he is almost out of his current supply.    His A1c is barely into the diabetes range.  He has been on metformin for several months.  Recheck A1c today.    He has a history of high cholesterol.  Needs refill on Pravachol.  Repeat lipid panel today.      1. Ankylosing spondylitis.  His x-rays reveal a loss of disc height at L5-S1, indicative of ankylosing spondylitis. This condition, a chronic spinal issue, is known to cause significant pain. His lab results show a sed rate of 10 and a CRP of 12, both of which are inflammatory markers. An MRI will be ordered to further investigate his back condition. His HLA-B27 and other inflammatory markers will be rechecked. A release of information will be sent to  Rheumatology to obtain his records. He will inform when his tramadol supply is running low, at which point a prescription for 90 tablets will be sent in, allowing him to take 2 to 3 tablets daily.  Follow-up here again in 3 months.  INSPECT is reviewed today and he is indeed receiving the quantities that he says he is.  His previous doctor wrote short supplies and then he wrote a long supply back in March which was finally filled in September.  Will plan to see him every 3 months for pain management.  Will continue our efforts to clarify his diagnosis as above.    2. Prediabetes.  His last A1c, recorded 7 months ago, was 6.8 percent, placing him in the diabetic range. However, he has not been officially diagnosed with diabetes. His A1c will be rechecked today to assess his response to metformin. If his A1c remains elevated, Mounjaro will be considered as a treatment option.    3. Hypogonadism.  He will continue with his current testosterone treatment.    4. Hypertension.  His blood pressure readings are within the normal range. He  will continue with his current Tenormin regimen.    5. Hypercholesterolemia.  A prescription for pravastatin will be sent in.    6. Health maintenance.  He will receive his influenza and pneumonia vaccines today. The Adacel vaccine was discussed, and he will consider it.          Call with any problems or concerns before next visit       Return in about 3 months (around 2/19/2025), or needs [facility] for  Rheumatology.  Patient or patient representative verbalized consent for the use of Ambient Listening during the visit with  Anamaria Concepcion MD for chart documentation. 11/19/2024  14:31 EST    Part of this note may be an electronic transcription/translation of spoken language to printed text using the Dragon Dictation System    Anamaria Concepcion MD11/19/202414:25 EST  This note has been electronically signed

## 2024-11-20 ENCOUNTER — TELEPHONE (OUTPATIENT)
Dept: FAMILY MEDICINE CLINIC | Facility: CLINIC | Age: 49
End: 2024-11-20
Payer: COMMERCIAL

## 2024-11-20 LAB
ALBUMIN SERPL-MCNC: 4.1 G/DL (ref 3.8–4.9)
ALP SERPL-CCNC: 99 IU/L (ref 44–121)
ALT SERPL-CCNC: 26 IU/L (ref 0–44)
AST SERPL-CCNC: 28 IU/L (ref 0–40)
BASOPHILS # BLD AUTO: 0.1 X10E3/UL (ref 0–0.2)
BASOPHILS NFR BLD AUTO: 1 %
BILIRUB SERPL-MCNC: <0.2 MG/DL (ref 0–1.2)
BUN SERPL-MCNC: 17 MG/DL (ref 6–24)
BUN/CREAT SERPL: 19 (ref 9–20)
CALCIUM SERPL-MCNC: 9.5 MG/DL (ref 8.7–10.2)
CHLORIDE SERPL-SCNC: 103 MMOL/L (ref 96–106)
CHOLEST SERPL-MCNC: 170 MG/DL (ref 100–199)
CO2 SERPL-SCNC: 26 MMOL/L (ref 20–29)
CREAT SERPL-MCNC: 0.89 MG/DL (ref 0.76–1.27)
EGFRCR SERPLBLD CKD-EPI 2021: 104 ML/MIN/1.73
EOSINOPHIL # BLD AUTO: 0.2 X10E3/UL (ref 0–0.4)
EOSINOPHIL NFR BLD AUTO: 3 %
ERYTHROCYTE [DISTWIDTH] IN BLOOD BY AUTOMATED COUNT: 14.6 % (ref 11.6–15.4)
GLOBULIN SER CALC-MCNC: 2.7 G/DL (ref 1.5–4.5)
GLUCOSE SERPL-MCNC: 88 MG/DL (ref 70–99)
HBA1C MFR BLD: 6.3 % (ref 4.8–5.6)
HCT VFR BLD AUTO: 39.3 % (ref 37.5–51)
HDLC SERPL-MCNC: 49 MG/DL
HGB BLD-MCNC: 12.3 G/DL (ref 13–17.7)
IMM GRANULOCYTES # BLD AUTO: 0 X10E3/UL (ref 0–0.1)
IMM GRANULOCYTES NFR BLD AUTO: 0 %
LDLC SERPL CALC-MCNC: 85 MG/DL (ref 0–99)
LYMPHOCYTES # BLD AUTO: 2 X10E3/UL (ref 0.7–3.1)
LYMPHOCYTES NFR BLD AUTO: 27 %
MCH RBC QN AUTO: 24.7 PG (ref 26.6–33)
MCHC RBC AUTO-ENTMCNC: 31.3 G/DL (ref 31.5–35.7)
MCV RBC AUTO: 79 FL (ref 79–97)
MONOCYTES # BLD AUTO: 0.5 X10E3/UL (ref 0.1–0.9)
MONOCYTES NFR BLD AUTO: 6 %
NEUTROPHILS # BLD AUTO: 4.7 X10E3/UL (ref 1.4–7)
NEUTROPHILS NFR BLD AUTO: 63 %
PLATELET # BLD AUTO: 399 X10E3/UL (ref 150–450)
POTASSIUM SERPL-SCNC: 5.3 MMOL/L (ref 3.5–5.2)
PROT SERPL-MCNC: 6.8 G/DL (ref 6–8.5)
RBC # BLD AUTO: 4.98 X10E6/UL (ref 4.14–5.8)
SODIUM SERPL-SCNC: 141 MMOL/L (ref 134–144)
TRIGL SERPL-MCNC: 215 MG/DL (ref 0–149)
VLDLC SERPL CALC-MCNC: 36 MG/DL (ref 5–40)
WBC # BLD AUTO: 7.5 X10E3/UL (ref 3.4–10.8)

## 2024-11-20 NOTE — TELEPHONE ENCOUNTER
Caller: PENNIE CARSON    Relationship: Emergency Contact    Best call back number: [phone]     What is the best time to reach you: ANY    Who are you requesting to speak with (clinical staff, provider,  specific staff member): CLINICAL    Do you know the name of the person who called: PENNIE    What was the call regarding: PATIENT IS REQUESTING THAT THE MRI ORDER BE SENT TO [facility] NOT [facility]. PLEASE CALL AND ADVISE.    Is it okay if the provider responds through MyChart:

## 2024-11-21 NOTE — TELEPHONE ENCOUNTER
I cancelled his appointment with [facility], resubmitted the referral to [facility]. I had to cancel out the PA and resubmit it with priority information now waiting on approval. Patient can schedule as soon as I get it approved again.

## 2024-12-27 DIAGNOSIS — M51.362 DEGENERATION OF INTERVERTEBRAL DISC OF LUMBAR REGION WITH DISCOGENIC BACK PAIN AND LOWER EXTREMITY PAIN: Primary | ICD-10-CM

## 2025-02-03 RX ORDER — AMITRIPTYLINE HYDROCHLORIDE 100 MG/1
100 TABLET ORAL
Qty: 90 TABLET | Refills: 1 | Status: SHIPPED | OUTPATIENT
Start: 2025-02-03

## 2025-02-09 DIAGNOSIS — M51.362 DEGENERATION OF INTERVERTEBRAL DISC OF LUMBAR REGION WITH DISCOGENIC BACK PAIN AND LOWER EXTREMITY PAIN: Primary | ICD-10-CM

## 2025-02-10 RX ORDER — TRAMADOL HYDROCHLORIDE 50 MG/1
50 TABLET ORAL EVERY 8 HOURS PRN
Qty: 21 TABLET | Refills: 0 | Status: SHIPPED | OUTPATIENT
Start: 2025-02-10

## 2025-02-18 ENCOUNTER — OFFICE VISIT (OUTPATIENT)
Dept: PAIN MEDICINE | Facility: CLINIC | Age: 50
End: 2025-02-18
Payer: COMMERCIAL

## 2025-02-18 VITALS
SYSTOLIC BLOOD PRESSURE: 144 MMHG | BODY MASS INDEX: 33.82 KG/M2 | WEIGHT: 229 LBS | HEART RATE: 89 BPM | OXYGEN SATURATION: 97 % | DIASTOLIC BLOOD PRESSURE: 89 MMHG | RESPIRATION RATE: 16 BRPM

## 2025-02-18 DIAGNOSIS — M47.817 LUMBOSACRAL SPONDYLOSIS WITHOUT MYELOPATHY: Primary | ICD-10-CM

## 2025-02-18 DIAGNOSIS — M47.814 THORACIC SPONDYLOSIS WITHOUT MYELOPATHY: ICD-10-CM

## 2025-02-18 DIAGNOSIS — M79.18 MYOFASCIAL PAIN SYNDROME: ICD-10-CM

## 2025-02-18 DIAGNOSIS — G89.4 CHRONIC PAIN SYNDROME: ICD-10-CM

## 2025-02-18 DIAGNOSIS — Z79.899 HIGH RISK MEDICATION USE: ICD-10-CM

## 2025-02-18 PROCEDURE — 99204 OFFICE O/P NEW MOD 45 MIN: CPT | Performed by: ANESTHESIOLOGY

## 2025-02-18 RX ORDER — TRAMADOL HYDROCHLORIDE 50 MG/1
50 TABLET ORAL 3 TIMES DAILY PRN
Qty: 90 TABLET | Refills: 0 | Status: SHIPPED | OUTPATIENT
Start: 2025-02-18

## 2025-02-18 NOTE — PROGRESS NOTES
Subjective   CC back pain  Dheeraj Flores is a 49 y.o. male with chronic back pain here for initial evaluation.  Referred by PCP..   Complains of continued axial back pain, constant worse with prolonged activity, bending twisting pain.  Pain associated with muscle spasm.  Denies radicular pain, denies weakness, denies saddle anesthesia bladder bowel continence.  He has tried physical therapy and epidurals in the past without relief.  Tried anti-inflammatories without relief.  Pain interfering with ADL and work.    On chronic opioid therapy with tramadol twice 3 times daily for several years having good relief and functional benefit without side effects.  Was only prescribed by PCP.  Recently change provider.    Imaging reviewed  L-spine MRI : Degenerative changes/spondylolisthesis worse at L3/4, L4-S1.  Partial fusion already completed T11-T12 with Modic type I changes.  No significant canal stenosis.    Pain Assessment   Location of Pain: Lower Back, R Hip, L Hip, joint  Description of Pain: Dull/Aching, Throbbing, Stabbing  Previous Pain Rating :5  Current Pain Ratin  Aggravating Factors: Activity  Alleviating Factors: Rest, Medication  Pain onset over 12 weeks ago  Interferes with ADL's.   [place] back pain disability scale scanned in chart    PEG Assessment   What number best describes your pain on average in the past week?5  What number best describes how, during the past week, pain has interfered with your enjoyment of life?5  What number best describes how, during the past week, pain has interfered with your general activity?  10    The following portions of the patient's history were reviewed and updated as appropriate: allergies, current medications, past family history, past medical history, past social history, past surgical history and problem list.     has a past medical history of Ankylosing spondylitis of site in spine, Arthritis, Arthritis of back, Cervical disc disorder, Chronic pain  disorder, Hip arthrosis, Hyperlipidemia, Hypertension, Joint pain, Knee swelling, Low back pain, Lumbosacral disc disease, Neck pain, Sleep apnea, and Type 2 diabetes mellitus.   has a past surgical history that includes Appendectomy.  Family history is unknown by patient.  Social History     Tobacco Use    Smoking status: Former     Current packs/day: 0.00     Average packs/day: 2.0 packs/day for 28.0 years (56.0 ttl pk-yrs)     Types: Cigarettes     Start date: 1989     Quit date: 10/22/2017     Years since quittin.3     Passive exposure: Past    Smokeless tobacco: Never   Substance Use Topics    Alcohol use: Not Currently       Review of Systems   Musculoskeletal:  Positive for arthralgias and back pain.   All other systems reviewed and are negative.      Objective   Physical Exam  Vitals reviewed.   Constitutional:       General: He is not in acute distress.  Pulmonary:      Effort: Pulmonary effort is normal.   Musculoskeletal:      Lumbar back: Tenderness present. Decreased range of motion.      Comments: Lumbar loading positive, pain on extension of low back past 5 degrees.  TTP on the lumbar facets noted.         /89   Pulse 89   Resp 16   Wt 104 kg (229 lb)   SpO2 97%   BMI 33.82 kg/m²     PHQ 9 on chart  Opioid risk tool low risk      Assessment & Plan   Diagnoses and all orders for this visit:    1. Lumbosacral spondylosis without myelopathy (Primary)  -     traMADol (ULTRAM) 50 MG tablet; Take 1 tablet by mouth 3 (Three) Times a Day As Needed for Severe Pain.  Dispense: 90 tablet; Refill: 0    2. Chronic pain syndrome  -     traMADol (ULTRAM) 50 MG tablet; Take 1 tablet by mouth 3 (Three) Times a Day As Needed for Severe Pain.  Dispense: 90 tablet; Refill: 0    3. Thoracic spondylosis without myelopathy  -     XR Spine Thoracic 2 View    4. Myofascial pain syndrome    5. High risk medication use  -     Urine Drug Screen - Urine, Clean Catch; Future      Summary  Dheeraj Flores is a 49  y.o. male with chronic back pain here for initial evaluation.  Referred by PCP..   Complains of continued axial back pain, constant worse with prolonged activity, bending twisting pain.  Pain associated with muscle spasm.  Denies radicular pain, denies weakness, denies saddle anesthesia bladder bowel continence.  He has tried physical therapy and epidurals in the past without relief.  Tried anti-inflammatories without relief.  Pain interfering with ADL and work.    On chronic opioid therapy with tramadol twice 3 times daily for several years having good relief and functional benefit without side effects.  Was only prescribed by PCP.    Denies any changes to chronic pain symptoms.  Recently change provider and was referred to continue opioid therapy.  Seen rheumatology in the past with questionable ankylosing spondylitis.  There is  no imaging evidence of this in the lumbar spine.    However there is a possible segmentation abnormality at T11-T12.  T-spine x-ray evaluation.  Results on HLA B27 also pending, ordered by PCP.    Most of his symptoms is axial back pain from spondylosis.  No radicular pain. We discussed medial branch block/possible RFA. he will consider.    Will continue tramadol 50 mg 3 times daily as needed.  UDS sent.  Inspect reviewed.  Discussed risk of tolerance, dependence, respiratory depression, coma and death associated with use of oral opioids for treatment of chronic nonmalignant pain.     RTC 1 to 2 months          Note is dictated utilizing voice recognition software. Unfortunately this leads to occasional typographical errors. I apologize in advance if the situation occurs. If questions occur please do not hesitate to call our office.

## 2025-02-21 ENCOUNTER — OFFICE VISIT (OUTPATIENT)
Dept: FAMILY MEDICINE CLINIC | Facility: CLINIC | Age: 50
End: 2025-02-21
Payer: COMMERCIAL

## 2025-02-21 VITALS
RESPIRATION RATE: 18 BRPM | HEIGHT: 69 IN | WEIGHT: 229.6 LBS | OXYGEN SATURATION: 96 % | DIASTOLIC BLOOD PRESSURE: 91 MMHG | BODY MASS INDEX: 34 KG/M2 | TEMPERATURE: 97.8 F | HEART RATE: 92 BPM | SYSTOLIC BLOOD PRESSURE: 142 MMHG

## 2025-02-21 DIAGNOSIS — Z87.891 PERSONAL HISTORY OF NICOTINE DEPENDENCE: ICD-10-CM

## 2025-02-21 DIAGNOSIS — R73.03 PREDIABETES: ICD-10-CM

## 2025-02-21 DIAGNOSIS — E78.2 MIXED HYPERLIPIDEMIA: Primary | ICD-10-CM

## 2025-02-21 DIAGNOSIS — I10 PRIMARY HYPERTENSION: ICD-10-CM

## 2025-02-21 DIAGNOSIS — M45.0 ANKYLOSING SPONDYLITIS OF MULTIPLE SITES IN SPINE: ICD-10-CM

## 2025-02-21 DIAGNOSIS — Z12.11 COLON CANCER SCREENING: ICD-10-CM

## 2025-02-21 DIAGNOSIS — M17.11 OSTEOARTHRITIS OF RIGHT KNEE, UNSPECIFIED OSTEOARTHRITIS TYPE: ICD-10-CM

## 2025-02-21 NOTE — PROGRESS NOTES
Answers submitted by the patient for this visit:  Problem not listed (Submitted on 2/19/2025)  Chief Complaint: Other medical problem  Reason for appointment: Follow up  abdominal pain: No  anorexia: No  joint pain: Yes  change in stool: No  chest pain: No  chills: No  nasal congestion: No  cough: No  diaphoresis: No  fatigue: No  fever: No  headaches: No  joint swelling: Yes  myalgias: No  nausea: No  neck pain: No  numbness: No  rash: No  sore throat: No  swollen glands: No  dysuria: No  vertigo: No  visual change: No  vomiting: No  weakness: No  Onset: more than 5 years  Chronicity: chronic  Frequency: daily  Subjective   Dheeraj Flores is a 49 y.o. male.   Chief Complaint   Patient presents with    Prediabetes    Hypertension    Hyperlipidemia       History of Present Illness   49 y.o. male with past medical history of chronic spinal pain, multi location osteoarthritis, prediabetes, hypogonadism, HTN, HLD.    Follows with pain management.  Saw Dr. Raymundo 3 days ago.   Was told he had ankylosing spondylitis.  MRI of the lumbar spine done back in December.  He had mild bilateral neuroforaminal stenosis at multiple levels, degenerative changes at T11-T12.  I ordered an HLA-B27 which was never done.    HTN-currently on lisinopril 40 mg/day, atenolol 25 mg/day.  Blood pressure today is barely at goal at 142/91.    Hypogonadism-on testosterone treatment per Dr. Ochoa.    HLD-last lipid panel was 3 months ago and it was excellent.    Prediabetes-last A1c was 6.3% 3 months ago.  A year ago it was 6.8%.  He was started on metformin at that time and continues on that.      History of Present Illness  The patient is a 48-year-old male who presents for evaluation of hypertension, prediabetes, back pain, knee pain, and health maintenance.    He has been diagnosed with hypertension and is currently on a regimen of lisinopril and atenolol. He has expressed interest in monitoring his blood pressure at home.    He has a  history of smoking, having quit approximately 8 to 9 years ago.    He has been experiencing back pain, which is being managed by a pain specialist. The specialist has recommended x-rays and is considering radiofrequency ablation as a potential treatment option. He has also been prescribed tramadol for pain management, which he takes sparingly due to its impact on his ability to perform his job duties.    He reports an incident where he slipped while entering a truck, resulting in an over flexion of his right knee.  He has noticed swelling in the area and experiences pain when attempting to step up into equipment or exit the vehicle. He has a history of knee issues and has previously consulted an orthopedic specialist, who was unable to identify the cause of his symptoms. (X-rays confirmed bilateral knee pain, right >> Left)  Despite the pain, he is able to walk but finds climbing difficult. He has been advised to use an arthritis cream for relief.    He has been diagnosed with prediabetes and is currently managing his condition through dietary modifications.    SOCIAL HISTORY  The patient quit smoking approximately 8-9 years ago.    FAMILY HISTORY  The patient does not have a family history of colon cancer that he is aware of.    MEDICATIONS  Current: lisinopril, atenolol, tramadol      Patient Active Problem List    Diagnosis Date Noted    Tricompartment osteoarthritis of both knees 04/25/2024    Establishing care with new doctor, encounter for 04/23/2024    Chronic pain of both knees 04/23/2024    Other fatigue 04/23/2024    Prediabetes 04/23/2024    BMI 34.0-34.9,adult 04/23/2024    Hypogonadism in male 04/23/2024    Mixed hyperlipidemia 04/23/2024    Primary hypertension 04/23/2024    Ankylosing spondylitis of multiple sites in spine 04/23/2024     Note Last Updated: 11/19/2024     Rheumatologist in [place] early 2000's  +[id number]  Positive x-rays      Class 1 obesity with serious comorbidity and body mass  "index (BMI) of 34.0 to 34.9 in adult 04/23/2024           Past Surgical History:   Procedure Laterality Date    APPENDECTOMY       Current Outpatient Medications on File Prior to Visit   Medication Sig    amitriptyline (ELAVIL) 100 MG tablet TAKE 1 TABLET BY MOUTH EVERY DAY AT BEDTIME    atenolol (TENORMIN) 25 MG tablet Take 1 tablet by mouth Daily.    coenzyme Q10 100 MG capsule Take 1 capsule by mouth Daily.    fish oil-omega-3 fatty acids 1000 MG capsule Take 2 capsules by mouth Daily.    indomethacin (INDOCIN) 50 MG capsule Take 1 capsule by mouth 2 (Two) Times a Day With Meals.    lisinopril (PRINIVIL,ZESTRIL) 40 MG tablet Take 1 tablet by mouth Daily.    Loratadine 10 MG capsule Take 1 capsule by mouth Daily.    metFORMIN (GLUCOPHAGE) 1000 MG tablet TAKE 1 TABLET BY MOUTH TWICE DAILY WITH MEALS    multivitamin with minerals tablet tablet Take 1 tablet by mouth Daily.    omeprazole (priLOSEC) 40 MG capsule Take 1 capsule by mouth Daily.    pravastatin (PRAVACHOL) 10 MG tablet Take 1 tablet by mouth Daily.    Probiotic Product (Risaquad-2) capsule capsule Take 1 capsule by mouth Daily.    sertraline (ZOLOFT) 50 MG tablet Take 1 tablet by mouth Daily.    Syringe/Needle, Disp, (BD Eclipse Syringe) 21G X 1\" 3 ML misc Use 1 each Every 14 (Fourteen) Days. Every 14 days for testosterone injection    testosterone cypionate (DEPO-TESTOSTERONE) 100 MG/ML solution injection Inject 1 mL into the appropriate muscle as directed by prescriber Every 14 (Fourteen) Days.    traMADol (ULTRAM) 50 MG tablet Take 1 tablet by mouth 3 (Three) Times a Day As Needed for Severe Pain.    Turmeric 500 MG capsule Take 2 capsules by mouth 2 (Two) Times a Day.     No current facility-administered medications on file prior to visit.     No Known Allergies  Social History     Socioeconomic History    Marital status:    Tobacco Use    Smoking status: Former     Current packs/day: 0.00     Average packs/day: 2.0 packs/day for 28.0 years " "(56.0 ttl pk-yrs)     Types: Cigarettes     Start date: 1989     Quit date: 10/22/2017     Years since quittin.3     Passive exposure: Past    Smokeless tobacco: Never   Vaping Use    Vaping status: Never Used   Substance and Sexual Activity    Alcohol use: Not Currently    Drug use: Never    Sexual activity: Yes     Partners: Female     Birth control/protection: Tubal ligation     Family History   Family history unknown: Yes       Review of Systems    Objective   /91 (BP Location: Left arm, Patient Position: Sitting, Cuff Size: Large Adult)   Pulse 92   Temp 97.8 °F (36.6 °C) (Infrared)   Resp 18   Ht 175.3 cm (69\")   Wt 104 kg (229 lb 9.6 oz)   SpO2 96%   BMI 33.91 kg/m²   Physical Exam  Constitutional:       Appearance: He is well-developed.      Comments:      HENT:      Head: Normocephalic and atraumatic.   Eyes:      Conjunctiva/sclera: Conjunctivae normal.   Cardiovascular:      Rate and Rhythm: Normal rate.   Pulmonary:      Effort: Pulmonary effort is normal.   Musculoskeletal:         General: Normal range of motion.      Cervical back: Normal range of motion.      Right lower leg: No edema.      Left lower leg: No edema.      Comments: No deformity of the right knee, but he does have a mild effusion and tenderness to palpation over the superior aspect of the right patella.  Anterior and posterior drawer sign are negative.  Knee is stable to lateral forces.  No bruising.     Skin:     General: Skin is warm and dry.      Findings: No rash.   Neurological:      Mental Status: He is alert and oriented to person, place, and time.   Psychiatric:         Behavior: Behavior normal.       Physical Exam  Vital Signs  Blood pressure is 142/91.      Office Visit on 2024   Component Date Value Ref Range Status    Hemoglobin A1C 2024 6.3 (H)  4.8 - 5.6 % Final    Comment:          Prediabetes: 5.7 - 6.4           Diabetes: >6.4           Glycemic control for adults with diabetes: <7.0   "    Glucose 11/19/2024 88  70 - 99 mg/dL Final    BUN 11/19/2024 17  6 - 24 mg/dL Final    Creatinine 11/19/2024 0.89  0.76 - 1.27 mg/dL Final    EGFR Result 11/19/2024 104  >59 mL/min/1.73 Final    BUN/Creatinine Ratio 11/19/2024 19  9 - 20 Final    Sodium 11/19/2024 141  134 - 144 mmol/L Final    Potassium 11/19/2024 5.3 (H)  3.5 - 5.2 mmol/L Final    Chloride 11/19/2024 103  96 - 106 mmol/L Final    Total CO2 11/19/2024 26  20 - 29 mmol/L Final    Calcium 11/19/2024 9.5  8.7 - 10.2 mg/dL Final    Total Protein 11/19/2024 6.8  6.0 - 8.5 g/dL Final    Albumin 11/19/2024 4.1  3.8 - 4.9 g/dL Final    Globulin 11/19/2024 2.7  1.5 - 4.5 g/dL Final    Total Bilirubin 11/19/2024 <0.2  0.0 - 1.2 mg/dL Final    Alkaline Phosphatase 11/19/2024 99  44 - 121 IU/L Final    AST (SGOT) 11/19/2024 28  0 - 40 IU/L Final    ALT (SGPT) 11/19/2024 26  0 - 44 IU/L Final    WBC 11/19/2024 7.5  3.4 - 10.8 x10E3/uL Final    Comment: **Effective December 2, 2024 profile [id number] WBC will be made**    non-orderable as a stand-alone order code.      RBC 11/19/2024 4.98  4.14 - 5.80 x10E6/uL Final    Hemoglobin 11/19/2024 12.3 (L)  13.0 - 17.7 g/dL Final    Hematocrit 11/19/2024 39.3  37.5 - 51.0 % Final    MCV 11/19/2024 79  79 - 97 fL Final    MCH 11/19/2024 24.7 (L)  26.6 - 33.0 pg Final    MCHC 11/19/2024 31.3 (L)  31.5 - 35.7 g/dL Final    RDW 11/19/2024 14.6  11.6 - 15.4 % Final    Platelets 11/19/2024 399  150 - 450 x10E3/uL Final    Neutrophil Rel % 11/19/2024 63  Not Estab. % Final    Lymphocyte Rel % 11/19/2024 27  Not Estab. % Final    Monocyte Rel % 11/19/2024 6  Not Estab. % Final    Eosinophil Rel % 11/19/2024 3  Not Estab. % Final    Basophil Rel % 11/19/2024 1  Not Estab. % Final    Neutrophils Absolute 11/19/2024 4.7  1.4 - 7.0 x10E3/uL Final    Lymphocytes Absolute 11/19/2024 2.0  0.7 - 3.1 x10E3/uL Final    Monocytes Absolute 11/19/2024 0.5  0.1 - 0.9 x10E3/uL Final    Eosinophils Absolute 11/19/2024 0.2  0.0 - 0.4 x10E3/uL  Final    Basophils Absolute 11/19/2024 0.1  0.0 - 0.2 x10E3/uL Final    Immature Granulocyte Rel % 11/19/2024 0  Not Estab. % Final    Immature Grans Absolute 11/19/2024 0.0  0.0 - 0.1 x10E3/uL Final    Total Cholesterol 11/19/2024 170  100 - 199 mg/dL Final    Triglycerides 11/19/2024 215 (H)  0 - 149 mg/dL Final    HDL Cholesterol 11/19/2024 49  >39 mg/dL Final    VLDL Cholesterol [place] 11/19/2024 36  5 - 40 mg/dL Final    LDL Chol Calc (NIH) 11/19/2024 85  0 - 99 mg/dL Final     Results  Laboratory Studies  A1c was 6.3.    Imaging  MRI showed neural foramen at the lower three levels of the spine on both sides.  X-ray showed degenerative arthrosis, right knee worse than left.          Assessment & Plan   Diagnoses and all orders for this visit:    1. Mixed hyperlipidemia (Primary)    2. Primary hypertension    3. Prediabetes    4. Ankylosing spondylitis of multiple sites in spine  -     HLA-B27 Antigen    5. Colon cancer screening  -     Cologuard - Stool, Per Rectum; Future    6. Personal history of nicotine dependence  -     CT Chest Low Dose Wo; Future    7. Osteoarthritis of right knee, unspecified osteoarthritis type  -     XR Knee 1 or 2 View Right; Future      Assessment & Plan  1. Hypertension.  His blood pressure reading today was 142/91, which is slightly above the target of 140/90. However, this is not a significant concern at this time. He is currently on lisinopril and atenolol. He is advised to monitor his blood pressure at home a few times a week at random times.    2. Prediabetes.  His A1c level has decreased from 6.8 to 6.3 over the past year, indicating an improvement in his prediabetic condition. He is advised to continue monitoring his diet. A recheck of his A1c will be done in a few months.    3. Back pain.  The MRI results indicate neural foraminal narrowing at the lower three levels of his spine, causing nerve compression. He is advised to consult with Dr. Raymundo regarding potential  radiofrequency ablation for pain management. An HLA-B27 test will be conducted today to rule out ankylosing spondylitis.    4. Knee pain.  He reports persistent knee pain, exacerbated by a recent injury. X-ray results from a year ago showed degenerative arthrosis, particularly in the right knee. An x-ray of the knee will be ordered to assess for any new damage. If any abnormalities are found, a referral to an orthopedic specialist will be made.    5. Health maintenance.  A Cologuard test will be ordered for colon cancer screening. Additionally, a lung cancer screening will be scheduled every few years due to his history of smoking.  Follow-up in 3 months to recheck his  Hypertension      Call with any problems or concerns before next visit       Return in about 3 months (around 5/21/2025).  Patient or patient representative verbalized consent for the use of Ambient Listening during the visit with  Anamaria Concepcion MD for chart documentation. 2/21/2025  16:43 EST    Part of this note may be an electronic transcription/translation of spoken language to printed text using the Dragon Dictation System    Anamaria Concepcion MD2/21/202516:44 EST  This note has been electronically signed

## 2025-02-26 LAB — HLA-B27 QL NAA+PROBE: POSITIVE

## 2025-03-17 ENCOUNTER — OFFICE VISIT (OUTPATIENT)
Dept: PAIN MEDICINE | Facility: CLINIC | Age: 50
End: 2025-03-17
Payer: COMMERCIAL

## 2025-03-17 VITALS
OXYGEN SATURATION: 97 % | SYSTOLIC BLOOD PRESSURE: 119 MMHG | DIASTOLIC BLOOD PRESSURE: 74 MMHG | WEIGHT: 230 LBS | HEART RATE: 84 BPM | RESPIRATION RATE: 16 BRPM | BODY MASS INDEX: 33.97 KG/M2

## 2025-03-17 DIAGNOSIS — M47.817 LUMBOSACRAL SPONDYLOSIS WITHOUT MYELOPATHY: Primary | ICD-10-CM

## 2025-03-17 DIAGNOSIS — M47.814 THORACIC SPONDYLOSIS WITHOUT MYELOPATHY: ICD-10-CM

## 2025-03-17 DIAGNOSIS — Z79.899 HIGH RISK MEDICATION USE: ICD-10-CM

## 2025-03-17 DIAGNOSIS — M45.9 ANKYLOSING SPONDYLITIS, UNSPECIFIED SITE OF SPINE: ICD-10-CM

## 2025-03-17 DIAGNOSIS — G89.4 CHRONIC PAIN SYNDROME: ICD-10-CM

## 2025-03-20 RX ORDER — TRAMADOL HYDROCHLORIDE 50 MG/1
50 TABLET ORAL 3 TIMES DAILY PRN
Qty: 90 TABLET | Refills: 5 | Status: SHIPPED | OUTPATIENT
Start: 2025-03-20

## 2025-03-20 NOTE — PROGRESS NOTES
Subjective   CC back pain  Dheeraj Flores is a 49 y.o. male with ankylosing spondylosis chronic back pain here for follow-up.  Continuing worsening axial back pain significantly impairing ADL.  Denies radicular pain.  He has tried stretching, home exercise program and anti-inflammatories without relief.  Chronic axial back pain, constant worse with prolonged activity, bending twisting pain.  Pain associated with muscle spasm.  Denies radicular pain, denies weakness, denies saddle anesthesia bladder bowel continence.  He has tried physical therapy and epidurals in the past without relief.  Tried anti-inflammatories without relief.  Pain interfering with ADL and work.    On chronic opioid therapy with tramadol twice 3 times daily for several years having good relief and functional benefit without side effects.  Was only prescribed by PCP.  Recently change provider.    Imaging reviewed  L-spine MRI : Degenerative changes/spondylolisthesis worse at L3/4, L4-S1.  Partial fusion already completed T11-T12 with Modic type I changes.  No significant canal stenosis.    Pain Assessment   Location of Pain: Lower Back, R Hip, L Hip, joint  Description of Pain: Dull/Aching, Throbbing, Stabbing  Previous Pain Rating :5  Current Pain Ratin  Aggravating Factors: Activity  Alleviating Factors: Rest, Medication  Pain onset over 12 weeks ago  Interferes with ADL's.   [place] back pain disability scale scanned in chart    PEG Assessment   What number best describes your pain on average in the past week?5  What number best describes how, during the past week, pain has interfered with your enjoyment of life?5  What number best describes how, during the past week, pain has interfered with your general activity?  10    The following portions of the patient's history were reviewed and updated as appropriate: allergies, current medications, past family history, past medical history, past social history, past surgical history and  problem list.    Review of Systems   Musculoskeletal:  Positive for arthralgias and back pain.   All other systems reviewed and are negative.      Objective   Physical Exam  Vitals reviewed.   Constitutional:       General: He is not in acute distress.  Pulmonary:      Effort: Pulmonary effort is normal.   Musculoskeletal:      Lumbar back: Tenderness present. Decreased range of motion.      Comments: Lumbar loading positive, pain on extension of low back past 5 degrees.  TTP on the lumbar facets noted.         /74 (BP Location: Left arm, Patient Position: Sitting, Cuff Size: Adult)   Pulse 84   Resp 16   Wt 104 kg (230 lb)   SpO2 97%   BMI 33.97 kg/m²     PHQ 9 on chart  Opioid risk tool low risk      Assessment & Plan   Diagnoses and all orders for this visit:    1. Lumbosacral spondylosis without myelopathy (Primary)  -     Facet  -     Facet    2. Thoracic spondylosis without myelopathy    3. Ankylosing spondylitis, unspecified site of spine    4. Chronic pain syndrome    5. High risk medication use      Summary  Dheeraj Flores is a 49 y.o. male with ankylosing spondylosis chronic back pain here for initial evaluation.  Referred by PCP..   Chronic pain from lumbar DDD spondylosis.  He has tried physical therapy and epidurals in the past without relief.  Tried anti-inflammatories without relief.  Pain interfering with ADL and work.  On chronic opioid therapy with tramadol twice 3 times daily for several years having good relief and functional benefit without side effects.  Was only prescribed by PCP.  HLA-B27 positive    Continuing worsening axial back pain significantly impairing ADL.  Denies radicular pain.  He has tried stretching, home exercise program and anti-inflammatories without relief.  Will schedule for bilateral lumbar medial branch block at L4/5, 5/S1 x 2.  If effective will plan RFA.    continue tramadol 50 mg 3 times daily as needed.  UDS sent.  Inspect reviewed.  Discussed risk of  tolerance, dependence, respiratory depression, coma and death associated with use of oral opioids for treatment of chronic nonmalignant pain.     RTC 1 to 2 months or for procedure          Note is dictated utilizing voice recognition software. Unfortunately this leads to occasional typographical errors. I apologize in advance if the situation occurs. If questions occur please do not hesitate to call our office.

## 2025-03-24 ENCOUNTER — HOSPITAL ENCOUNTER (OUTPATIENT)
Dept: PAIN MEDICINE | Facility: HOSPITAL | Age: 50
Discharge: HOME | End: 2025-03-24
Payer: COMMERCIAL

## 2025-03-24 VITALS
WEIGHT: 230 LBS | OXYGEN SATURATION: 96 % | RESPIRATION RATE: 16 BRPM | BODY MASS INDEX: 34.07 KG/M2 | HEIGHT: 69 IN | HEART RATE: 75 BPM | SYSTOLIC BLOOD PRESSURE: 158 MMHG | TEMPERATURE: 97.1 F | DIASTOLIC BLOOD PRESSURE: 83 MMHG

## 2025-03-24 DIAGNOSIS — M47.817 LUMBOSACRAL SPONDYLOSIS WITHOUT MYELOPATHY: Primary | ICD-10-CM

## 2025-03-24 DIAGNOSIS — R52 PAIN: ICD-10-CM

## 2025-03-24 PROCEDURE — 25510000001 IOPAMIDOL 41 % SOLUTION: Performed by: ANESTHESIOLOGY

## 2025-03-24 PROCEDURE — 25010000002 LIDOCAINE PF 1% 1 % SOLUTION: Performed by: ANESTHESIOLOGY

## 2025-03-24 PROCEDURE — 64493 INJ PARAVERT F JNT L/S 1 LEV: CPT | Performed by: ANESTHESIOLOGY

## 2025-03-24 PROCEDURE — 77003 FLUOROGUIDE FOR SPINE INJECT: CPT

## 2025-03-24 PROCEDURE — 64494 INJ PARAVERT F JNT L/S 2 LEV: CPT | Performed by: ANESTHESIOLOGY

## 2025-03-24 PROCEDURE — 25010000002 BUPIVACAINE (PF) 0.25 % SOLUTION: Performed by: ANESTHESIOLOGY

## 2025-03-24 RX ORDER — BUPIVACAINE HYDROCHLORIDE 2.5 MG/ML
10 INJECTION, SOLUTION EPIDURAL; INFILTRATION; INTRACAUDAL; PERINEURAL ONCE
Status: COMPLETED | OUTPATIENT
Start: 2025-03-24 | End: 2025-03-24

## 2025-03-24 RX ORDER — LIDOCAINE HYDROCHLORIDE 10 MG/ML
5 INJECTION, SOLUTION EPIDURAL; INFILTRATION; INTRACAUDAL; PERINEURAL ONCE
Status: COMPLETED | OUTPATIENT
Start: 2025-03-24 | End: 2025-03-24

## 2025-03-24 RX ORDER — IOPAMIDOL 408 MG/ML
3 INJECTION, SOLUTION INTRATHECAL
Status: COMPLETED | OUTPATIENT
Start: 2025-03-24 | End: 2025-03-24

## 2025-03-24 RX ADMIN — BUPIVACAINE HYDROCHLORIDE 10 ML: 2.5 INJECTION, SOLUTION EPIDURAL; INFILTRATION; INTRACAUDAL; PERINEURAL at 09:20

## 2025-03-24 RX ADMIN — LIDOCAINE HYDROCHLORIDE 5 ML: 10 INJECTION, SOLUTION EPIDURAL; INFILTRATION; INTRACAUDAL; PERINEURAL at 09:16

## 2025-03-24 RX ADMIN — IOPAMIDOL 3 ML: 408 INJECTION, SOLUTION INTRATHECAL at 09:19

## 2025-03-25 ENCOUNTER — TELEPHONE (OUTPATIENT)
Dept: PAIN MEDICINE | Facility: HOSPITAL | Age: 50
End: 2025-03-25
Payer: COMMERCIAL

## 2025-03-25 NOTE — TELEPHONE ENCOUNTER
Post procedure phone call completed.  Pt states they are doing good and denies questions or concerns. Pt stated they received 50% relief from procedure, that they feel it is still working this morning. Reported pain level of 4.

## 2025-03-25 NOTE — PROCEDURES
"Subjective   CC back pain  Dheeraj Flores is a 49 y.o. male.  With lumbar spondylosis here for bilateral lumbar medial branch block at L4/5, L5/S1.    No anticoagulation    Pain Assessment   Location of Pain: Lower Back, R Hip, L Hip,  Description of Pain: Dull/Aching, Throbbing, Stabbing  Previous Pain Rating :5  Current Pain Ratin  Aggravating Factors: Activity  Alleviating Factors: Rest, Medication  Pain onset over 12 weeks ago  Pain interferes with ADL's  [place] back pain disability scale scanned in chart     The following portions of the patient's history were reviewed and updated as appropriate: allergies, current medications, past family history, past medical history, past social history, past surgical history and problem list.      Review of Systems  As in HPI  Objective   Physical Exam  Vitals reviewed.   Constitutional:       General: He is not in acute distress.     Comments: ASA 3, MP2   Pulmonary:      Effort: Pulmonary effort is normal.       /83 (BP Location: Left arm, Patient Position: Sitting)   Pulse 75   Temp 97.1 °F (36.2 °C) (Skin)   Resp 16   Ht 175.3 cm (69\")   Wt 104 kg (230 lb)   SpO2 96%   BMI 33.97 kg/m²     Assessment & Plan    underwent bilateral lumbar medial branch block at L4/S1.    RTC 4-6 weeks or as needed for repeat    DATE OF PROCEDURE: 3/24/2025    PREOPERATIVE DIAGNOSIS: Lumbar spondylosis without myelopathy    POSTOPERATIVE DIAGNOSIS: same    PROCEDURE PERFORMED: Jose Lumbar Medial Branch Block at , L4/L5, L5/S1.     The patient presents with a history of lumbosacral spondylosis bilaterally at level  [ L4/L5 ] [ L5/S1].   The patient understands the risks and benefits of the procedure and wishes to proceed. The patient was seen in the preoperative area.  Patient's consent was obtained and updated.  Vitals were taken.  Patient was then brought to the procedure suite and placed in a prone position. The appropriate anatomic area was widely prepped with Chloroprep " and draped in a sterile fashion.  Noninvasive monitoring per routine anesthesia protocol was placed.  Under fluoroscopic guidance an AP view with caudad cephaled tilt was obtained. A 22 gauge curved tip spinal needle was passed through skin anesthetized with 1% Lidocaine without epinephrine. The needle tip was guided into the superior medial aspect of the transverse process at   [ L4 ] [ L5 ] [ sacral ala ].  Next 0.25 mL of  preservative free contrast were injected.   At this point 0.5 mL of 0.25% bupivacaine was injected. A sterile dressing was placed over the puncture site. The patient tolerated the procedure with  no complications. They were then brought to the post procedure area where they recovered nicely.    Reported 90% relief 15 to 20 minutes after the procedure.

## 2025-04-01 RX ORDER — ATENOLOL 25 MG/1
25 TABLET ORAL DAILY
Qty: 90 TABLET | Refills: 1 | Status: SHIPPED | OUTPATIENT
Start: 2025-04-01

## 2025-04-04 RX ORDER — SERTRALINE HYDROCHLORIDE 100 MG/1
50 TABLET, FILM COATED ORAL DAILY
Qty: 90 TABLET | Refills: 1 | Status: SHIPPED | OUTPATIENT
Start: 2025-04-04

## 2025-04-14 ENCOUNTER — HOSPITAL ENCOUNTER (OUTPATIENT)
Dept: PAIN MEDICINE | Facility: HOSPITAL | Age: 50
Discharge: HOME | End: 2025-04-14
Payer: COMMERCIAL

## 2025-04-14 VITALS
TEMPERATURE: 97.3 F | HEIGHT: 69 IN | WEIGHT: 230 LBS | RESPIRATION RATE: 16 BRPM | OXYGEN SATURATION: 96 % | BODY MASS INDEX: 34.07 KG/M2 | HEART RATE: 72 BPM | SYSTOLIC BLOOD PRESSURE: 132 MMHG | DIASTOLIC BLOOD PRESSURE: 87 MMHG

## 2025-04-14 DIAGNOSIS — M47.817 LUMBOSACRAL SPONDYLOSIS WITHOUT MYELOPATHY: Primary | ICD-10-CM

## 2025-04-14 DIAGNOSIS — R52 PAIN: ICD-10-CM

## 2025-04-14 PROCEDURE — 25010000002 BUPIVACAINE (PF) 0.25 % SOLUTION: Performed by: ANESTHESIOLOGY

## 2025-04-14 PROCEDURE — 25510000001 IOPAMIDOL 41 % SOLUTION: Performed by: ANESTHESIOLOGY

## 2025-04-14 PROCEDURE — 77003 FLUOROGUIDE FOR SPINE INJECT: CPT

## 2025-04-14 PROCEDURE — 25010000002 LIDOCAINE PF 1% 1 % SOLUTION: Performed by: ANESTHESIOLOGY

## 2025-04-14 PROCEDURE — 64493 INJ PARAVERT F JNT L/S 1 LEV: CPT | Performed by: ANESTHESIOLOGY

## 2025-04-14 PROCEDURE — 64494 INJ PARAVERT F JNT L/S 2 LEV: CPT | Performed by: ANESTHESIOLOGY

## 2025-04-14 RX ORDER — LIDOCAINE HYDROCHLORIDE 10 MG/ML
5 INJECTION, SOLUTION EPIDURAL; INFILTRATION; INTRACAUDAL; PERINEURAL ONCE
Status: COMPLETED | OUTPATIENT
Start: 2025-04-14 | End: 2025-04-14

## 2025-04-14 RX ORDER — LISINOPRIL 40 MG/1
40 TABLET ORAL DAILY
Qty: 90 TABLET | Refills: 3 | Status: SHIPPED | OUTPATIENT
Start: 2025-04-14

## 2025-04-14 RX ORDER — IOPAMIDOL 408 MG/ML
3 INJECTION, SOLUTION INTRATHECAL
Status: COMPLETED | OUTPATIENT
Start: 2025-04-14 | End: 2025-04-14

## 2025-04-14 RX ORDER — BUPIVACAINE HYDROCHLORIDE 2.5 MG/ML
10 INJECTION, SOLUTION EPIDURAL; INFILTRATION; INTRACAUDAL; PERINEURAL ONCE
Status: COMPLETED | OUTPATIENT
Start: 2025-04-14 | End: 2025-04-14

## 2025-04-14 RX ADMIN — LIDOCAINE HYDROCHLORIDE 5 ML: 10 INJECTION, SOLUTION EPIDURAL; INFILTRATION; INTRACAUDAL; PERINEURAL at 09:28

## 2025-04-14 RX ADMIN — IOPAMIDOL 3 ML: 408 INJECTION, SOLUTION INTRATHECAL at 09:30

## 2025-04-14 RX ADMIN — BUPIVACAINE HYDROCHLORIDE 10 ML: 2.5 INJECTION, SOLUTION EPIDURAL; INFILTRATION; INTRACAUDAL; PERINEURAL at 09:31

## 2025-04-14 NOTE — PROCEDURES
"Subjective   CC back pain  Dheeraj Flores is a 49 y.o. male.  With lumbar spondylosis here for repeat bilateral lumbar medial branch block at L4/5, L5/S1.  No anticoagulation    Pain Assessment   Location of Pain: Lower Back, R Hip, L Hip,  Description of Pain: Dull/Aching, Throbbing, Stabbing  Previous Pain Rating :5  Current Pain Ratin  Aggravating Factors: Activity  Alleviating Factors: Rest, Medication  Pain onset over 12 weeks ago  Pain interferes with ADL's  [place] back pain disability scale scanned in chart     The following portions of the patient's history were reviewed and updated as appropriate: allergies, current medications, past family history, past medical history, past social history, past surgical history and problem list.      Review of Systems  As in HPI  Objective   Physical Exam  Vitals reviewed.   Constitutional:       General: He is not in acute distress.     Comments: ASA 3, MP2   Pulmonary:      Effort: Pulmonary effort is normal.       /87 (Patient Position: Sitting)   Pulse 72   Temp 97.3 °F (36.3 °C) (Skin)   Resp 16   Ht 175.3 cm (69\")   Wt 104 kg (230 lb)   SpO2 96%   BMI 33.97 kg/m²     Assessment & Plan    underwent repeat bilateral lumbar medial branch block at L4/S1.  Reported 80% relief 15 to 20 minutes after the procedure.  Also had 70 to 80% relief with first bilateral lumbar medial branch block lasted 2 days.  RTC 4-6 weeks or as needed for repeat    DATE OF PROCEDURE: 2025    PREOPERATIVE DIAGNOSIS: Lumbar spondylosis without myelopathy    POSTOPERATIVE DIAGNOSIS: same    PROCEDURE PERFORMED: Jose Lumbar Medial Branch Block at , L4/L5, L5/S1.     The patient presents with a history of lumbosacral spondylosis bilaterally at level  [ L4/L5 ] [ L5/S1].   The patient understands the risks and benefits of the procedure and wishes to proceed. The patient was seen in the preoperative area.  Patient's consent was obtained and updated.  Vitals were taken.  Patient " was then brought to the procedure suite and placed in a prone position. The appropriate anatomic area was widely prepped with Chloroprep and draped in a sterile fashion.  Noninvasive monitoring per routine anesthesia protocol was placed.  Under fluoroscopic guidance an AP view with caudad cephaled tilt was obtained. A 22 gauge curved tip spinal needle was passed through skin anesthetized with 1% Lidocaine without epinephrine. The needle tip was guided into the superior medial aspect of the transverse process at   [ L4 ] [ L5 ] [ sacral ala ].  Next 0.25 mL of  preservative free contrast were injected.   At this point 0.5 mL of 0.25% bupivacaine was injected. A sterile dressing was placed over the puncture site. The patient tolerated the procedure with  no complications. They were then brought to the post procedure area where they recovered nicely.    Reported 90% relief 15 to 20 minutes after the procedure.

## 2025-04-15 ENCOUNTER — TELEPHONE (OUTPATIENT)
Dept: PAIN MEDICINE | Facility: HOSPITAL | Age: 50
End: 2025-04-15
Payer: COMMERCIAL

## 2025-04-15 NOTE — TELEPHONE ENCOUNTER
Post procedure phone call completed.  Pt states they are doing good and denies questions or concerns. Pt states pain is a 6 with 60% relief of pain.

## 2025-05-07 RX ORDER — INDOMETHACIN 50 MG/1
50 CAPSULE ORAL 2 TIMES DAILY WITH MEALS
Qty: 60 CAPSULE | Refills: 1 | Status: SHIPPED | OUTPATIENT
Start: 2025-05-07

## 2025-05-13 ENCOUNTER — OFFICE VISIT (OUTPATIENT)
Dept: PAIN MEDICINE | Facility: CLINIC | Age: 50
End: 2025-05-13
Payer: COMMERCIAL

## 2025-05-13 VITALS
SYSTOLIC BLOOD PRESSURE: 117 MMHG | RESPIRATION RATE: 16 BRPM | WEIGHT: 229 LBS | DIASTOLIC BLOOD PRESSURE: 74 MMHG | HEART RATE: 78 BPM | BODY MASS INDEX: 33.82 KG/M2 | OXYGEN SATURATION: 97 %

## 2025-05-13 DIAGNOSIS — M47.814 THORACIC SPONDYLOSIS WITHOUT MYELOPATHY: ICD-10-CM

## 2025-05-13 DIAGNOSIS — M45.9 ANKYLOSING SPONDYLITIS, UNSPECIFIED SITE OF SPINE: Primary | ICD-10-CM

## 2025-05-13 DIAGNOSIS — M47.817 LUMBOSACRAL SPONDYLOSIS WITHOUT MYELOPATHY: ICD-10-CM

## 2025-05-13 DIAGNOSIS — G89.4 CHRONIC PAIN SYNDROME: ICD-10-CM

## 2025-05-13 DIAGNOSIS — Z79.899 HIGH RISK MEDICATION USE: ICD-10-CM

## 2025-05-15 RX ORDER — GABAPENTIN 300 MG/1
300 CAPSULE ORAL 3 TIMES DAILY
Qty: 90 CAPSULE | Refills: 2 | Status: SHIPPED | OUTPATIENT
Start: 2025-05-15

## 2025-05-15 NOTE — PROGRESS NOTES
Subjective   CC back pain  Dheeraj Flores is a 49 y.o. male with ankylosing spondylosis chronic back pain here for follow-up.    Tried bilateral lumbar medial branch block reports marginal relief.    Chronic axial back pain, constant worse with prolonged activity, bending twisting pain.  Pain associated with muscle spasm.  Denies radicular pain, denies weakness, denies saddle anesthesia bladder bowel continence.  He has tried physical therapy and epidurals in the past without relief.  Tried anti-inflammatories without relief.  Pain interfering with ADL and work.    On chronic opioid therapy with tramadol twice 3 times daily for several years having good relief and functional benefit without side effects.  Was only prescribed by PCP.  Recently change provider.    Imaging reviewed  L-spine MRI : Degenerative changes/spondylolisthesis worse at L3/4, L4-S1.  Partial fusion already completed T11-T12 with Modic type I changes.  No significant canal stenosis.    Pain Assessment   Location of Pain: Lower Back, R Hip, L Hip, joint  Description of Pain: Dull/Aching, Throbbing, Stabbing  Previous Pain Rating :6  Current Pain Ratin  Aggravating Factors: Activity  Alleviating Factors: Rest, Medication  Pain onset over 12 weeks ago  Interferes with ADL's.   [place] back pain disability scale scanned in chart    PEG Assessment   What number best describes your pain on average in the past week?5  What number best describes how, during the past week, pain has interfered with your enjoyment of life?3  What number best describes how, during the past week, pain has interfered with your general activity?  10    The following portions of the patient's history were reviewed and updated as appropriate: allergies, current medications, past family history, past medical history, past social history, past surgical history and problem list.    Review of Systems   Musculoskeletal:  Positive for arthralgias and back pain.   All other  systems reviewed and are negative.      Objective   Physical Exam  Vitals reviewed.   Constitutional:       General: He is not in acute distress.  Pulmonary:      Effort: Pulmonary effort is normal.   Musculoskeletal:      Lumbar back: Tenderness present. Decreased range of motion.      Comments: Lumbar loading positive, pain on extension of low back past 5 degrees.  TTP on the lumbar facets noted.         /74 (BP Location: Left arm, Patient Position: Sitting, Cuff Size: Adult)   Pulse 78   Resp 16   Wt 104 kg (229 lb)   SpO2 97%   BMI 33.82 kg/m²     PHQ 9 on chart  Opioid risk tool low risk      Assessment & Plan   Diagnoses and all orders for this visit:    1. Ankylosing spondylitis, unspecified site of spine (Primary)  -     gabapentin (NEURONTIN) 300 MG capsule; Take 1 capsule by mouth 3 (Three) Times a Day.  Dispense: 90 capsule; Refill: 2    2. Lumbosacral spondylosis without myelopathy  -     gabapentin (NEURONTIN) 300 MG capsule; Take 1 capsule by mouth 3 (Three) Times a Day.  Dispense: 90 capsule; Refill: 2    3. Thoracic spondylosis without myelopathy  -     gabapentin (NEURONTIN) 300 MG capsule; Take 1 capsule by mouth 3 (Three) Times a Day.  Dispense: 90 capsule; Refill: 2    4. Chronic pain syndrome    5. High risk medication use      Summary  Dheeraj Flores is a 49 y.o. male with ankylosing spondylosis chronic back pain here for initial evaluation.  Referred by PCP..   Chronic pain from lumbar DDD spondylosis.  He has tried physical therapy and epidurals in the past without relief.  Tried anti-inflammatories without relief.  Pain interfering with ADL and work.  On chronic opioid therapy with tramadol twice 3 times daily for several years having good relief and functional benefit without side effects.  Was only prescribed by PCP.  HLA-B27 positive    Tried bilateral lumbar medial branch block reports marginal relief.  Will start gabapentin.      continue tramadol 50 mg 3 times daily as  needed.  UDS sent.  Inspect reviewed.  Discussed risk of tolerance, dependence, respiratory depression, coma and death associated with use of oral opioids for treatment of chronic nonmalignant pain.     RTC 1 to 2 months or for procedure          Note is dictated utilizing voice recognition software. Unfortunately this leads to occasional typographical errors. I apologize in advance if the situation occurs. If questions occur please do not hesitate to call our office.

## 2025-05-26 DIAGNOSIS — R79.89 LOW TESTOSTERONE IN MALE: ICD-10-CM

## 2025-05-27 RX ORDER — SYRINGE WITH NEEDLE, 1 ML 25GX5/8"
SYRINGE, EMPTY DISPOSABLE MISCELLANEOUS
Qty: 6 EACH | Refills: 0 | OUTPATIENT
Start: 2025-05-27

## 2025-05-27 RX ORDER — TESTOSTERONE CYPIONATE 200 MG/ML
INJECTION, SOLUTION INTRAMUSCULAR
Qty: 4 ML | Refills: 0 | OUTPATIENT
Start: 2025-05-27

## 2025-06-02 ENCOUNTER — OFFICE VISIT (OUTPATIENT)
Dept: FAMILY MEDICINE CLINIC | Facility: CLINIC | Age: 50
End: 2025-06-02
Payer: COMMERCIAL

## 2025-06-02 VITALS
DIASTOLIC BLOOD PRESSURE: 67 MMHG | HEIGHT: 69 IN | OXYGEN SATURATION: 97 % | RESPIRATION RATE: 16 BRPM | HEART RATE: 83 BPM | TEMPERATURE: 97.3 F | BODY MASS INDEX: 33.86 KG/M2 | SYSTOLIC BLOOD PRESSURE: 108 MMHG | WEIGHT: 228.6 LBS

## 2025-06-02 DIAGNOSIS — Z12.5 ENCOUNTER FOR PROSTATE CANCER SCREENING: ICD-10-CM

## 2025-06-02 DIAGNOSIS — I10 PRIMARY HYPERTENSION: Primary | ICD-10-CM

## 2025-06-02 DIAGNOSIS — R73.03 PREDIABETES: ICD-10-CM

## 2025-06-02 DIAGNOSIS — M51.360 DEGENERATION OF INTERVERTEBRAL DISC OF LUMBAR REGION WITH DISCOGENIC BACK PAIN: ICD-10-CM

## 2025-06-02 DIAGNOSIS — R19.5 POSITIVE COLORECTAL CANCER SCREENING USING COLOGUARD TEST: ICD-10-CM

## 2025-06-02 DIAGNOSIS — E78.2 MIXED HYPERLIPIDEMIA: ICD-10-CM

## 2025-06-02 PROBLEM — M45.0 ANKYLOSING SPONDYLITIS OF MULTIPLE SITES IN SPINE: Status: RESOLVED | Noted: 2024-04-23 | Resolved: 2025-06-02

## 2025-06-02 PROCEDURE — 99214 OFFICE O/P EST MOD 30 MIN: CPT | Performed by: FAMILY MEDICINE

## 2025-06-02 RX ORDER — CLOTRIMAZOLE 1 %
1 CREAM (GRAM) TOPICAL 2 TIMES DAILY
Qty: 113 G | Refills: 2 | Status: SHIPPED | OUTPATIENT
Start: 2025-06-02

## 2025-06-02 NOTE — PROGRESS NOTES
Subjective   Dheeraj Flores is a 49 y.o. male.   Chief Complaint   Patient presents with    Hypertension     3 month f/u        History of Present Illness   49 y.o. male  with past medical history of chronic spinal pain, multi location osteoarthritis, prediabetes, hypogonadism, HTN, HLD.  I last saw him 3 months ago.       Follows with pain management.  Saw Dr. Raymundo about 2 weeks ago.   Was told he had ankylosing spondylitis.  We repeated an HLA-B27 that was positive.  MRI of the lumbar spine done back in December.  He had mild bilateral neuroforaminal stenosis at multiple levels, degenerative changes at T11-T12.       HTN-currently on lisinopril 40 mg/day, atenolol 25 mg/day.  Blood pressure today     Hypogonadism-on testosterone treatment per Dr. Ochoa.     HLD-last lipid panel was 6 months ago and it was excellent.     Prediabetes-last A1c was 6.3% 6 months ago.  A year ago it was 6.8%.  He was started on metformin at that time and continues on that.    Colon cancer screening-Cologuard was done and it was positive.  Has an upcoming colonoscopy with Dr. Mayer July 23.  History of Present Illness  The patient presents for evaluation of hypertension, prediabetes, back pain, and a skin lesion.    He reports a significant weight loss of approximately 15 pounds. Blood pressure is monitored only during medical visits and not at home.    He has been diagnosed with prediabetes, with the last A1c test conducted 6 months ago, yielding a result of 6.3. Blood work is scheduled with Dr. Ochoa soon to monitor hypogonadism.    A colonoscopy is scheduled for 07/23/2025 following a positive Cologuard test.    Back pain has shown some improvement with the initiation of gabapentin therapy. He was prescribed a regimen of 3 doses of gabapentin per day but has been self-administering only 2 doses, one in the morning and one at night, due to his work schedule.    A spot on the posterior aspect of his leg has remained unchanged  since its initial appearance. He reports no associated itching, stinging, or burning sensations.    He is currently on testosterone therapy and is interested in having his PSA level checked.      Patient Active Problem List    Diagnosis Date Noted    Degeneration of intervertebral disc of lumbar region with discogenic back pain 06/02/2025     Note Last Updated: 6/2/2025     [facility] 04/25/2024    Establishing care with new doctor, encounter for 04/23/2024    Chronic pain of both knees 04/23/2024    Other fatigue 04/23/2024    Prediabetes 04/23/2024    BMI 34.0-34.9,adult 04/23/2024    Hypogonadism in male 04/23/2024    Mixed hyperlipidemia 04/23/2024    Primary hypertension 04/23/2024    Class 1 obesity with serious comorbidity and body mass index (BMI) of 34.0 to 34.9 in adult 04/23/2024           Past Surgical History:   Procedure Laterality Date    APPENDECTOMY       Current Outpatient Medications on File Prior to Visit   Medication Sig    amitriptyline (ELAVIL) 100 MG tablet TAKE 1 TABLET BY MOUTH EVERY DAY AT BEDTIME    atenolol (TENORMIN) 25 MG tablet Take 1 tablet by mouth once daily    coenzyme Q10 100 MG capsule Take 1 capsule by mouth Daily.    gabapentin (NEURONTIN) 300 MG capsule Take 1 capsule by mouth 3 (Three) Times a Day.    indomethacin (INDOCIN) 50 MG capsule Take 1 capsule by mouth 2 (Two) Times a Day With Meals.    lisinopril (PRINIVIL,ZESTRIL) 40 MG tablet Take 1 tablet by mouth once daily    Loratadine 10 MG capsule Take 1 capsule by mouth Daily.    metFORMIN (GLUCOPHAGE) 1000 MG tablet TAKE 1 TABLET BY MOUTH TWICE DAILY WITH MEALS    multivitamin with minerals tablet tablet Take 1 tablet by mouth Daily.    omeprazole (priLOSEC) 40 MG capsule Take 1 capsule by mouth Daily.    pravastatin (PRAVACHOL) 10 MG tablet Take 1 tablet by mouth Daily.    Probiotic Product (Risaquad-2) capsule capsule Take 1 capsule by mouth Daily.     "sertraline (Zoloft) 100 MG tablet Take 0.5 tablets by mouth Daily.    Syringe/Needle, Disp, (BD Eclipse Syringe) 21G X 1\" 3 ML misc Use 1 each Every 14 (Fourteen) Days. Every 14 days for testosterone injection    testosterone cypionate (DEPO-TESTOSTERONE) 100 MG/ML solution injection Inject 1 mL into the appropriate muscle as directed by prescriber Every 14 (Fourteen) Days.    traMADol (ULTRAM) 50 MG tablet Take 1 tablet by mouth 3 (Three) Times a Day As Needed for Severe Pain.    Turmeric 500 MG capsule Take 2 capsules by mouth 2 (Two) Times a Day.     No current facility-administered medications on file prior to visit.     No Known Allergies  Social History     Socioeconomic History    Marital status:    Tobacco Use    Smoking status: Former     Current packs/day: 0.00     Average packs/day: 2.0 packs/day for 28.0 years (56.0 ttl pk-yrs)     Types: Cigarettes     Start date: 1989     Quit date: 10/22/2017     Years since quittin.6     Passive exposure: Past    Smokeless tobacco: Never   Vaping Use    Vaping status: Never Used   Substance and Sexual Activity    Alcohol use: Not Currently    Drug use: Never    Sexual activity: Yes     Partners: Female     Birth control/protection: Tubal ligation     Family History   Family history unknown: Yes       Review of Systems    Objective   /67 (BP Location: Right arm, Patient Position: Sitting, Cuff Size: Adult)   Pulse 83   Temp 97.3 °F (36.3 °C)   Resp 16   Ht 175.3 cm (69\")   Wt 104 kg (228 lb 9.6 oz)   SpO2 97%   BMI 33.76 kg/m²   Physical Exam  Constitutional:       General: He is not in acute distress.     Appearance: He is well-developed.      Comments:      HENT:      Head: Normocephalic and atraumatic.   Eyes:      Conjunctiva/sclera: Conjunctivae normal.   Cardiovascular:      Rate and Rhythm: Normal rate.   Pulmonary:      Effort: Pulmonary effort is normal.   Musculoskeletal:         General: Normal range of motion.      Cervical " back: Normal range of motion.   Skin:     General: Skin is warm and dry.      Findings: Rash present.      Comments: Pinkish area on the posterior right calf.  Circular, some central clearing.  Second lesion decided smaller, pink, slightly dry flaky skin.   Neurological:      Mental Status: He is alert and oriented to person, place, and time.      Gait: Gait normal.   Psychiatric:         Mood and Affect: Mood normal.         Behavior: Behavior normal.       Physical Exam  Skin: Area of tinea on the back of the leg      Results Encounter on 03/07/2025   Component Date Value Ref Range Status    Cologuard 03/31/2025 Positive (A)  Negative Final    Comment: POSITIVE TEST RESULT. A positive Cologuard result should be followed with a colonoscopy or visual examination of the colon. The normal value (reference range) for this assay is negative.    TEST DESCRIPTION: Composite algorithmic analysis of stool DNA-biomarkers with hemoglobin immunoassay.   Quantitative values of individual biomarkers are not reportable and are not associated with individual biomarker result reference ranges. Cologuard is intended for colorectal cancer screening of adults of either sex, 45 years or older, who are at average-risk for colorectal cancer (CRC). Cologuard has been approved for use by the U.S. FDA. The performance of Cologuard was established in a cross sectional study of average-risk adults aged 50-84. Cologuard performance in patients ages 45 to 49 years was estimated by sub-group analysis of near-age groups. Colonoscopies performed for a positive result may find as the most clinically significant lesion: colorectal cancer [4.0%], advanced adenoma                            (including sessile serrated polyps greater than or equal to 1cm diameter) [20%] or non- advanced adenoma [31%]; or no colorectal neoplasia [45%]. These estimates are derived from a prospective cross-sectional screening study of 10,000 individuals at average risk  for colorectal cancer who were screened with both Cologuard and colonoscopy. (Basil MCCARTHY et al, N Engl J Med 2014;370(14):0536-5870.) Cologuard may produce a false negative or false positive result (no colorectal cancer or precancerous polyp present at colonoscopy follow up). A negative Cologuard test result does not guarantee the absence of CRC or advanced adenoma (pre-cancer). The current Cologuard screening interval is every 3 years. (American Cancer Society and U.S. Multi-Society Task Force). Cologuard performance data in a 10,000 patient pivotal study using colonoscopy as the reference method can be accessed at the following location: [email or web address]/results. Additional description of the Cologuard test process,                            warnings and precautions can be found at www.[employer].     Office Visit on 02/21/2025   Component Date Value Ref Range Status    HLA B27 02/21/2025 Positive   Final    Comment: HLA-B*27 Positive  This patient is positive for an HLA-B*27 allele that is  associated with spondyloarthropathies. This procedure rules  out the B*27:06 and 27:09 alleles, which the literature  suggests are not associated with spondyloarthropathies.  There are several very rare HLA allele that are not ruled  out by this assay that may result in a false positive.  Clinical correlation is recommended.  B27 allele interpretation for all loci based on IMGT/HLA  database version 3.51.0  This test was developed and its performance characteristics  determined by [facility].  It has not been cleared or approved  by the Food and Drug Administration.  The FDA has determined that such clearance or approval is  not necessary.  HLA Lab CLIA ID Number [id number]  This test was performed using Polymerase Chain Reaction (PCR) and  Sequence Specific Oligonucleotide Probes (SSOP) technique. Sequence  Based Typing (SBT) may be used as a supplemental method when  necessary.  If you have questions, please edis                            l [facility] customer service at  [phone] or email at [email or web address].       Results  Labs   - A1c: 6.3         Assessment & Plan   Diagnoses and all orders for this visit:    1. Primary hypertension (Primary)  -     Comprehensive Metabolic Panel  -     Magnesium    2. Mixed hyperlipidemia    3. Prediabetes  -     Hemoglobin A1c    4. Positive colorectal cancer screening using Cologuard test    5. Encounter for prostate cancer screening  -     PSA Screen    6. Degeneration of intervertebral disc of lumbar region with discogenic back pain    Other orders  -     clotrimazole (LOTRIMIN) 1 % cream; Apply 1 Application topically to the appropriate area as directed 2 (Two) Times a Day.  Dispense: 113 g; Refill: 2      Assessment & Plan  1. Hypertension.  - Blood pressure readings have been consistently within the normal range.  - Advised to monitor blood pressure weekly at home.  - If readings remain low, a reduction in medication dosage will be considered.  - Current medications to remain unchanged.    2. Prediabetes.  - A1c level measured 6 months ago was 6.3, indicating a prediabetic state.  - Goal is to prevent the A1c from increasing.  - An order for an A1c test has been placed today to monitor this condition.  - Monitoring A1c levels twice a year is recommended.    3. Back pain.  - Prescribed gabapentin for back pain, which is reported to be helping.  - Currently taking gabapentin twice a day, in the morning and at night, skipping the midday dose to avoid feeling cloudy-headed at work.  - Advised to continue with the current regimen.  - Monitoring for adaptation to the medication and any side effects.  Keep follow-up with pain management per their recommendations.   despite the presence of an HLA-B27 positive antigen, clinically he does not have ankylosing spondylitis.    4. Tinea.  - Spot on the back of the leg appears to be tinea, a skin fungus.  - Advised to apply clotrimazole cream to the  affected area twice daily, once in the morning and once at night.  - Monitoring for improvement in the condition.  - If symptoms persist, further evaluation may be necessary.    5. Health maintenance.  - Scheduled for a colonoscopy on 07/23/2025 following a positive Cologuard test.  - PSA test will be conducted today to screen for prostate cancer and monitor testosterone therapy.  - Monitoring PSA levels annually is recommended.  - Follow-up in 6 months to reassess conditions and review test results.        Call with any problems or concerns before next visit       Return in about 6 months (around 12/2/2025).  Patient or patient representative verbalized consent for the use of Ambient Listening during the visit with  Anamaria Concepcion MD for chart documentation. 6/2/2025  16:59 EDT    Part of this note may be an electronic transcription/translation of spoken language to printed text using the Dragon Dictation System    Anamaria Concepcion MD6/2/202516:54 EDT  This note has been electronically signed

## 2025-06-03 LAB
ALBUMIN SERPL-MCNC: 4.1 G/DL (ref 3.8–4.9)
ALP SERPL-CCNC: 110 IU/L (ref 44–121)
ALT SERPL-CCNC: 28 IU/L (ref 0–44)
AST SERPL-CCNC: 27 IU/L (ref 0–40)
BASOPHILS # BLD AUTO: 0.1 X10E3/UL (ref 0–0.2)
BASOPHILS NFR BLD AUTO: 1 %
BILIRUB SERPL-MCNC: 0.2 MG/DL (ref 0–1.2)
BUN SERPL-MCNC: 18 MG/DL (ref 6–24)
BUN/CREAT SERPL: 19 (ref 9–20)
CALCIUM SERPL-MCNC: 9.5 MG/DL (ref 8.7–10.2)
CHLORIDE SERPL-SCNC: 103 MMOL/L (ref 96–106)
CO2 SERPL-SCNC: 22 MMOL/L (ref 20–29)
CREAT SERPL-MCNC: 0.95 MG/DL (ref 0.76–1.27)
EGFRCR SERPLBLD CKD-EPI 2021: 97 ML/MIN/1.73
EOSINOPHIL # BLD AUTO: 0.3 X10E3/UL (ref 0–0.4)
EOSINOPHIL NFR BLD AUTO: 3 %
ERYTHROCYTE [DISTWIDTH] IN BLOOD BY AUTOMATED COUNT: 17 % (ref 11.6–15.4)
GLOBULIN SER CALC-MCNC: 2.9 G/DL (ref 1.5–4.5)
GLUCOSE SERPL-MCNC: 95 MG/DL (ref 70–99)
HBA1C MFR BLD: 6.6 % (ref 4.8–5.6)
HCT VFR BLD AUTO: 37.7 % (ref 37.5–51)
HGB BLD-MCNC: 11.4 G/DL (ref 13–17.7)
IMM GRANULOCYTES # BLD AUTO: 0 X10E3/UL (ref 0–0.1)
IMM GRANULOCYTES NFR BLD AUTO: 0 %
LYMPHOCYTES # BLD AUTO: 2.1 X10E3/UL (ref 0.7–3.1)
LYMPHOCYTES NFR BLD AUTO: 24 %
MAGNESIUM SERPL-MCNC: 2 MG/DL (ref 1.6–2.3)
MCH RBC QN AUTO: 22.2 PG (ref 26.6–33)
MCHC RBC AUTO-ENTMCNC: 30.2 G/DL (ref 31.5–35.7)
MCV RBC AUTO: 73 FL (ref 79–97)
MONOCYTES # BLD AUTO: 0.6 X10E3/UL (ref 0.1–0.9)
MONOCYTES NFR BLD AUTO: 7 %
NEUTROPHILS # BLD AUTO: 5.5 X10E3/UL (ref 1.4–7)
NEUTROPHILS NFR BLD AUTO: 65 %
PLATELET # BLD AUTO: 398 X10E3/UL (ref 150–450)
POTASSIUM SERPL-SCNC: 4.8 MMOL/L (ref 3.5–5.2)
PROT SERPL-MCNC: 7 G/DL (ref 6–8.5)
PSA SERPL-MCNC: 0.5 NG/ML (ref 0–4)
RBC # BLD AUTO: 5.14 X10E6/UL (ref 4.14–5.8)
SODIUM SERPL-SCNC: 140 MMOL/L (ref 134–144)
TESTOST SERPL-MCNC: 298 NG/DL (ref 264–916)
WBC # BLD AUTO: 8.5 X10E3/UL (ref 3.4–10.8)

## 2025-06-04 ENCOUNTER — TELEPHONE (OUTPATIENT)
Dept: PAIN MEDICINE | Facility: CLINIC | Age: 50
End: 2025-06-04
Payer: COMMERCIAL

## 2025-06-04 NOTE — TELEPHONE ENCOUNTER
Caller: PENNIE   Relationship to Patient: WIFE  Phone Number: [phone]  Reason for Call: PATIENTS WIFE IS CALLING STATING THAT THE LAB THAT WAS USED FOR THE DRUG TEST WAS OUT OF NETWORK FOR THE PATIENT AND THEY GOT A BILL  DOLLARS THEY WANT TO MAKE SURE THIS NEVER HAPPENS AGAIN

## 2025-06-04 NOTE — TELEPHONE ENCOUNTER
Spoke to Mignon. The 600$ was from an EOB. She has not received a bill yet. Advised her to call the number on the statement once she gets it if the amount is still 600

## 2025-06-07 ENCOUNTER — PATIENT MESSAGE (OUTPATIENT)
Dept: FAMILY MEDICINE CLINIC | Facility: CLINIC | Age: 50
End: 2025-06-07
Payer: COMMERCIAL

## 2025-06-09 RX ORDER — OMEPRAZOLE 40 MG/1
40 CAPSULE, DELAYED RELEASE ORAL DAILY
Qty: 90 CAPSULE | Refills: 3 | Status: SHIPPED | OUTPATIENT
Start: 2025-06-09

## 2025-06-10 DIAGNOSIS — R79.89 LOW TESTOSTERONE IN MALE: ICD-10-CM

## 2025-06-10 NOTE — TELEPHONE ENCOUNTER
Caller: Dheeraj Flores    Relationship: Self    Best call back number: [phone]    Requested Prescriptions:   Requested Prescriptions     Pending Prescriptions Disp Refills    testosterone cypionate (DEPO-TESTOSTERONE) 100 MG/ML solution injection 4 mL 2     Sig: Inject 1 mL into the appropriate muscle as directed by prescriber Every 14 (Fourteen) Days.        Pharmacy where request should be sent:  [facility] [id number] [place] [phone] [place] [phone] FX [[id number]]     Last office visit with prescribing clinician: 10/31/2024   Last telemedicine visit with prescribing clinician: Visit date not found   Next office visit with prescribing clinician: 9/23/2025     Additional details provided by patient:     Does the patient have less than a 3 day supply:  [x] Yes  [] No    Would you like a call back once the refill request has been completed: [] Yes [] No    If the office needs to give you a call back, can they leave a voicemail: [] Yes [] No    Ilya Morejon Rep   06/10/25 12:49 EDT

## 2025-06-11 RX ORDER — TESTOSTERONE CYPIONATE 1000 MG/10ML
100 INJECTION, SOLUTION INTRAMUSCULAR
Qty: 4 ML | Refills: 4 | OUTPATIENT
Start: 2025-06-11

## 2025-07-07 RX ORDER — INDOMETHACIN 50 MG/1
50 CAPSULE ORAL 2 TIMES DAILY WITH MEALS
Qty: 60 CAPSULE | Refills: 1 | Status: SHIPPED | OUTPATIENT
Start: 2025-07-07

## 2025-07-21 ENCOUNTER — ANESTHESIA EVENT (OUTPATIENT)
Dept: GASTROENTEROLOGY | Facility: HOSPITAL | Age: 50
End: 2025-07-21
Payer: COMMERCIAL

## 2025-07-21 RX ORDER — SODIUM CHLORIDE 0.9 % (FLUSH) 0.9 %
10 SYRINGE (ML) INJECTION EVERY 12 HOURS SCHEDULED
Status: CANCELLED | OUTPATIENT
Start: 2025-07-21

## 2025-07-21 RX ORDER — SODIUM CHLORIDE 0.9 % (FLUSH) 0.9 %
10 SYRINGE (ML) INJECTION AS NEEDED
Status: CANCELLED | OUTPATIENT
Start: 2025-07-21

## 2025-07-21 RX ORDER — SODIUM CHLORIDE 9 MG/ML
9 INJECTION, SOLUTION INTRAVENOUS CONTINUOUS PRN
Status: CANCELLED | OUTPATIENT
Start: 2025-07-21 | End: 2025-07-22

## 2025-07-23 ENCOUNTER — ANESTHESIA (OUTPATIENT)
Dept: GASTROENTEROLOGY | Facility: HOSPITAL | Age: 50
End: 2025-07-23
Payer: COMMERCIAL

## 2025-07-23 ENCOUNTER — ON CAMPUS - OUTPATIENT (OUTPATIENT)
Dept: URBAN - METROPOLITAN AREA HOSPITAL 85 | Facility: HOSPITAL | Age: 50
End: 2025-07-23
Payer: COMMERCIAL

## 2025-07-23 ENCOUNTER — HOSPITAL ENCOUNTER (OUTPATIENT)
Facility: HOSPITAL | Age: 50
Setting detail: HOSPITAL OUTPATIENT SURGERY
Discharge: HOME | End: 2025-07-23
Attending: INTERNAL MEDICINE | Admitting: INTERNAL MEDICINE
Payer: COMMERCIAL

## 2025-07-23 VITALS
HEART RATE: 82 BPM | TEMPERATURE: 98.7 F | DIASTOLIC BLOOD PRESSURE: 82 MMHG | WEIGHT: 211.4 LBS | RESPIRATION RATE: 19 BRPM | BODY MASS INDEX: 31.31 KG/M2 | HEIGHT: 69 IN | OXYGEN SATURATION: 96 % | SYSTOLIC BLOOD PRESSURE: 118 MMHG

## 2025-07-23 DIAGNOSIS — R19.5 ABNORMAL FECES: ICD-10-CM

## 2025-07-23 DIAGNOSIS — R19.5 OTHER FECAL ABNORMALITIES: ICD-10-CM

## 2025-07-23 DIAGNOSIS — K63.3 ULCER OF INTESTINE: ICD-10-CM

## 2025-07-23 DIAGNOSIS — K52.9 NONINFECTIVE GASTROENTERITIS AND COLITIS, UNSPECIFIED: ICD-10-CM

## 2025-07-23 PROCEDURE — 25010000002 PROPOFOL 10 MG/ML EMULSION

## 2025-07-23 PROCEDURE — 25010000002 LIDOCAINE PF 2% 2 % SOLUTION

## 2025-07-23 PROCEDURE — 88305 TISSUE EXAM BY PATHOLOGIST: CPT | Performed by: INTERNAL MEDICINE

## 2025-07-23 PROCEDURE — 45380 COLONOSCOPY AND BIOPSY: CPT | Performed by: INTERNAL MEDICINE

## 2025-07-23 PROCEDURE — 25810000003 SODIUM CHLORIDE 0.9 % SOLUTION

## 2025-07-23 RX ORDER — DIPHENHYDRAMINE HYDROCHLORIDE 50 MG/ML
12.5 INJECTION, SOLUTION INTRAMUSCULAR; INTRAVENOUS
Status: DISCONTINUED | OUTPATIENT
Start: 2025-07-23 | End: 2025-07-23 | Stop reason: HOSPADM

## 2025-07-23 RX ORDER — IPRATROPIUM BROMIDE AND ALBUTEROL SULFATE 2.5; .5 MG/3ML; MG/3ML
3 SOLUTION RESPIRATORY (INHALATION) ONCE AS NEEDED
Status: DISCONTINUED | OUTPATIENT
Start: 2025-07-23 | End: 2025-07-23 | Stop reason: HOSPADM

## 2025-07-23 RX ORDER — ONDANSETRON 2 MG/ML
4 INJECTION INTRAMUSCULAR; INTRAVENOUS ONCE AS NEEDED
Status: DISCONTINUED | OUTPATIENT
Start: 2025-07-23 | End: 2025-07-23 | Stop reason: HOSPADM

## 2025-07-23 RX ORDER — EPHEDRINE SULFATE 5 MG/ML
5 INJECTION INTRAVENOUS ONCE AS NEEDED
Status: DISCONTINUED | OUTPATIENT
Start: 2025-07-23 | End: 2025-07-23 | Stop reason: HOSPADM

## 2025-07-23 RX ORDER — PROPOFOL 10 MG/ML
VIAL (ML) INTRAVENOUS AS NEEDED
Status: DISCONTINUED | OUTPATIENT
Start: 2025-07-23 | End: 2025-07-23 | Stop reason: SURG

## 2025-07-23 RX ORDER — LIDOCAINE HYDROCHLORIDE 20 MG/ML
INJECTION, SOLUTION EPIDURAL; INFILTRATION; INTRACAUDAL; PERINEURAL AS NEEDED
Status: DISCONTINUED | OUTPATIENT
Start: 2025-07-23 | End: 2025-07-23 | Stop reason: SURG

## 2025-07-23 RX ORDER — LABETALOL HYDROCHLORIDE 5 MG/ML
5 INJECTION, SOLUTION INTRAVENOUS
Status: DISCONTINUED | OUTPATIENT
Start: 2025-07-23 | End: 2025-07-23 | Stop reason: HOSPADM

## 2025-07-23 RX ORDER — SODIUM CHLORIDE 9 MG/ML
INJECTION, SOLUTION INTRAVENOUS CONTINUOUS PRN
Status: DISCONTINUED | OUTPATIENT
Start: 2025-07-23 | End: 2025-07-23 | Stop reason: SURG

## 2025-07-23 RX ORDER — HYDRALAZINE HYDROCHLORIDE 20 MG/ML
5 INJECTION INTRAMUSCULAR; INTRAVENOUS
Status: DISCONTINUED | OUTPATIENT
Start: 2025-07-23 | End: 2025-07-23 | Stop reason: HOSPADM

## 2025-07-23 RX ADMIN — PROPOFOL 80 MG: 10 INJECTION, EMULSION INTRAVENOUS at 09:09

## 2025-07-23 RX ADMIN — PROPOFOL 150 MCG/KG/MIN: 10 INJECTION, EMULSION INTRAVENOUS at 09:10

## 2025-07-23 RX ADMIN — PROPOFOL 20 MG: 10 INJECTION, EMULSION INTRAVENOUS at 09:12

## 2025-07-23 RX ADMIN — LIDOCAINE HYDROCHLORIDE 100 MG: 20 INJECTION, SOLUTION EPIDURAL; INFILTRATION; INTRACAUDAL; PERINEURAL at 09:09

## 2025-07-23 RX ADMIN — SODIUM CHLORIDE: 9 INJECTION, SOLUTION INTRAVENOUS at 09:06

## 2025-07-23 NOTE — ANESTHESIA POSTPROCEDURE EVALUATION
Patient: Dheeraj Flores    Procedure Summary       Date: 07/23/25 Room / Location: [facility] ENDOSCOPY 1 / [facility] ENDOSCOPY    Anesthesia Start: 0906 Anesthesia Stop: 0937    Procedure: COLONOSCOPY WITH BIOPSY X TWO AREAS Diagnosis:       Abnormal feces      (Abnormal feces [R19.5])    Surgeons: Matthieu Mayer MD Provider: Tavo Ortiz MD    Anesthesia Type: general ASA Status: 3            Anesthesia Type: general    Vitals  Vitals Value Taken Time   /82 07/23/25 09:55   Temp     Pulse 83 07/23/25 09:56   Resp 19 07/23/25 09:54   SpO2 97 % 07/23/25 09:56   Vitals shown include unfiled device data.        Post Anesthesia Care and Evaluation    Patient location during evaluation: PACU  Patient participation: complete - patient participated  Level of consciousness: awake  Pain scale: See nurse's notes for pain score.  Pain management: adequate    Airway patency: patent  Anesthetic complications: No anesthetic complications  PONV Status: none  Cardiovascular status: acceptable  Respiratory status: acceptable and spontaneous ventilation  Hydration status: acceptable    Comments: Patient seen and examined postoperatively; vital signs stable; SpO2 greater than or equal to 90%; cardiopulmonary status stable; nausea/vomiting adequately controlled; pain adequately controlled; no apparent anesthesia complications; patient discharged from anesthesia care when discharge criteria were met

## 2025-07-23 NOTE — DISCHARGE INSTRUCTIONS
A responsible adult should stay with you and you should rest quietly for the rest of the day.    Do not drink alcohol, drive, operate any heavy machinery or power tools or make any legal/important decisions for the next 24 hours.     If you begin to experience severe pain, increased shortness of breath, racing heartbeat or a fever above 101 F, seek immediate medical attention.     Follow up with MD as instructed. Call office for results in 3 to 5 days if needed.     For further questions please call Dr. Mayer's at [phone]    Recommendations:  1.  Regular diet  2.  Follow-up on colonic biopsies  3.  If he has symptoms suggestive of inflammatory bowel disease, I would recommend follow-up in my office to discuss further treatment options  4.  Also consider checking IBD serologies if he has symptoms suggestive of inflammatory bowel disease  5.  Repeat colonoscopy in 10 years

## 2025-07-23 NOTE — OP NOTE
COLONOSCOPY Procedure Report    Patient Name:  Dheeraj Flores  YOB: 1975    Date of Surgery:  7/23/2025     Pre-Op Diagnosis:  Abnormal feces [R19.5]    Post-Op Diagnosis:  1.  Colonic ulcers       Procedure/CPT® Codes:  No CPT Code Applied in Case Entry    Procedure(s):  COLONOSCOPY WITH BIOPSY X TWO AREAS    Staff:  Surgeon(s):  Matthieu Mayer MD      Anesthesia: Monitored Anesthesia Care    Description of Procedure:  A description of the procedure as well as risks, benefits and alternative methods were explained to the patient who voiced understanding and signed the corresponding consent form. A physical exam was performed and vital signs were monitored throughout the procedure.    After informed consent was obtained the patient was placed in the left lateral decubitus position and sedated as above.  Digital rectal examination was performed and was normal.  The Olympus video colonoscope was inserted into the rectum and advanced to the cecum without difficulty.  A careful examination of the colon was performed as the colonoscope was slowly withdrawn.  The bowel prep was excellent.  The cecum and appendiceal orifice were identified. The scope was then retroflexed and the distal rectum was visualized. The procedure was not difficult and there were no immediate complications.  There was no blood loss.    Findings:   Irregular stellate shallow somewhat chronic appearing ulcer within the cecal tip suggestive of nonsteroidal use versus chronic inflammatory bowel disease.  There was also ulceration of the ileocecal valve and scarring of the terminal ileum but no definite ulcers noted within the terminal ileum.  Multiple biopsies taken within the cecum at the site of ulceration.  There were small shallow 3 to 4 mm ulcers in the sigmoid colon.  Multiple biopsies taken from the ulcers to look for evidence of inflammatory bowel disease.    Impression:  Colonic ulcers    Recommendations:  1.  Regular  diet  2.  Follow-up on colonic biopsies  3.  If he has symptoms suggestive of inflammatory bowel disease, I would recommend follow-up in my office to discuss further treatment options  4.  Also consider checking IBD serologies if he has symptoms suggestive of inflammatory bowel disease  5.  Repeat colonoscopy in 10 years      Matthieu Mayer MD     Date: 7/23/2025    Time: 09:32 EDT

## 2025-07-23 NOTE — H&P
GI PREOPERATIVE HISTORY AND PHYSICAL:    Referring Provider:    Anamaria Concepcion MD    Chief complaint: Positive Cologuard     History of present illness:      Dheeraj Flores is a 49 y.o. male who presents today for Procedure(s):  COLONOSCOPY for the indications listed below.     Positive Cologuard    The updated Patient Profile was reviewed prior to the procedure, in conjunction with the Physical Exam, including medical conditions, surgical procedures, medications, allergies, family history and social history.     Pre-operatively, I reviewed the indication(s) for the procedure, the risks of the procedure [including but not limited to: unexpected bleeding possibly requiring hospitalization and/or unplanned repeat procedures, perforation possibly requiring surgical treatment, missed lesions and complications of sedation/MAC (also explained by anesthesia staff)].     I have evaluated the patient for risks associated with the planned anesthesia and the procedure to be performed and find the patient an acceptable candidate for IV sedation.    Multiple opportunities were provided for any questions or concerns, and all questions were answered satisfactorily before any anesthesia was administered. We will proceed with the planned procedure.    Past Medical History:  Past Medical History:   Diagnosis Date    Ankylosing spondylitis of site in spine     Arthritis     Arthritis of back     Cervical disc disorder     Chronic pain disorder     Hip arthrosis     Hyperlipidemia     Hypertension     Joint pain     Knee swelling     Low back pain     Lumbosacral disc disease     Neck pain     Sleep apnea     Type 2 diabetes mellitus     pt states borderline-- no official diagnosis       Past Surgical History:  Past Surgical History:   Procedure Laterality Date    APPENDECTOMY         Social History:  Social History     Tobacco Use    Smoking status: Former     Current packs/day: 0.00     Average packs/day: 2.0 packs/day for  "28.0 years (56.0 ttl pk-yrs)     Types: Cigarettes     Start date: 1989     Quit date: 10/22/2017     Years since quittin.7     Passive exposure: Past    Smokeless tobacco: Never   Vaping Use    Vaping status: Never Used   Substance Use Topics    Alcohol use: Not Currently    Drug use: Never       Family History:  Family History   Family history unknown: Yes       Medications:  No medications prior to admission.       Scheduled Meds:  Continuous Infusions:No current facility-administered medications for this encounter.    PRN Meds:.    ALLERGIES:  Patient has no known allergies.    ROS:  The following systems were reviewed and negative;   Constitution:  No fevers, chills, no unintentional weight loss  Skin: no rash, no jaundice  Eyes:  No blurry vision, no eye pain  HENT:  No change in hearing or smell  Resp:  No dyspnea or cough  CV:  No chest pain or palpitations  :  No dysuria, hematuria  Musculoskeletal:  No leg cramps or arthralgias  Neuro:  No tremor, no numbness  Psych:  No depression or confsuion    Objective     Vital Signs:   Vitals:    07/10/25 1031   Weight: 99.8 kg (220 lb)   Height: 175.3 cm (69\")       Physical Exam:       General Appearance:    Awake and alert, in no acute distress   Head:    Normocephalic, without obvious abnormality, atraumatic   Throat:   No oral lesions, no thrush, oral mucosa moist   Lungs  Cardiac:  Abdomen:  Extremities:     Respirations regular, even and unlabored    Regular rate and rhythm, no murmur, gallop, rub    Non-distended, good bowel sounds, non tender, no masses     No edema, pulses 2+   Skin:   No rash, no jaundice       Results Review:  Lab Results (last 24 hours)       ** No results found for the last 24 hours. **            Imaging Results (Last 24 Hours)       ** No results found for the last 24 hours. **             I reviewed the patient's labs and imaging.    ASSESSMENT AND PLAN:  Positive Cologuard    Active Problems:    * No active hospital " problems. *       Procedure(s):  COLONOSCOPY      I discussed the patients findings and my recommendations with the patient.    Matthieu Mayer MD  07/23/25  07:04 EDT

## 2025-07-24 LAB
LAB AP CASE REPORT: NORMAL
LAB AP DIAGNOSIS COMMENT: NORMAL
PATH REPORT.FINAL DX SPEC: NORMAL
PATH REPORT.GROSS SPEC: NORMAL

## 2025-08-08 RX ORDER — AMITRIPTYLINE HYDROCHLORIDE 100 MG/1
100 TABLET ORAL
Qty: 90 TABLET | Refills: 3 | Status: SHIPPED | OUTPATIENT
Start: 2025-08-08

## 2025-08-26 ENCOUNTER — OFFICE VISIT (OUTPATIENT)
Dept: PAIN MEDICINE | Facility: CLINIC | Age: 50
End: 2025-08-26
Payer: COMMERCIAL

## 2025-08-26 VITALS
BODY MASS INDEX: 33.21 KG/M2 | RESPIRATION RATE: 16 BRPM | SYSTOLIC BLOOD PRESSURE: 124 MMHG | HEART RATE: 71 BPM | WEIGHT: 225 LBS | OXYGEN SATURATION: 97 % | DIASTOLIC BLOOD PRESSURE: 75 MMHG

## 2025-08-26 DIAGNOSIS — M47.814 THORACIC SPONDYLOSIS WITHOUT MYELOPATHY: ICD-10-CM

## 2025-08-26 DIAGNOSIS — M79.18 MYOFASCIAL PAIN SYNDROME: ICD-10-CM

## 2025-08-26 DIAGNOSIS — M47.817 LUMBOSACRAL SPONDYLOSIS WITHOUT MYELOPATHY: ICD-10-CM

## 2025-08-26 DIAGNOSIS — M45.9 ANKYLOSING SPONDYLITIS, UNSPECIFIED SITE OF SPINE: Primary | ICD-10-CM

## 2025-08-26 DIAGNOSIS — Z79.899 HIGH RISK MEDICATION USE: ICD-10-CM

## 2025-08-26 RX ORDER — TRAMADOL HYDROCHLORIDE 50 MG/1
50 TABLET ORAL 3 TIMES DAILY PRN
Qty: 90 TABLET | Refills: 5 | Status: SHIPPED | OUTPATIENT
Start: 2025-08-26

## (undated) DEVICE — SINGLE-USE BIOPSY FORCEPS: Brand: RADIAL JAW 4

## (undated) DEVICE — PK ENDO GI 50